# Patient Record
Sex: MALE | Race: WHITE | NOT HISPANIC OR LATINO | Employment: OTHER | RURAL
[De-identification: names, ages, dates, MRNs, and addresses within clinical notes are randomized per-mention and may not be internally consistent; named-entity substitution may affect disease eponyms.]

---

## 2020-02-07 DIAGNOSIS — F41.9 ANXIETY: Primary | ICD-10-CM

## 2020-02-07 RX ORDER — PAROXETINE HYDROCHLORIDE 20 MG/1
1 TABLET, FILM COATED ORAL DAILY
COMMUNITY
Start: 2019-02-19 | End: 2020-02-07 | Stop reason: SDUPTHER

## 2020-02-07 RX ORDER — ASPIRIN 81 MG/1
TABLET ORAL DAILY
COMMUNITY
Start: 2019-02-19

## 2020-02-07 RX ORDER — PAROXETINE HYDROCHLORIDE 20 MG/1
20 TABLET, FILM COATED ORAL DAILY
Qty: 30 TABLET | Refills: 2 | Status: SHIPPED | OUTPATIENT
Start: 2020-02-07 | End: 2020-02-26

## 2020-02-26 DIAGNOSIS — F41.9 ANXIETY: ICD-10-CM

## 2020-02-26 RX ORDER — PAROXETINE HYDROCHLORIDE 20 MG/1
TABLET, FILM COATED ORAL
Qty: 90 TABLET | Refills: 0 | Status: SHIPPED | OUTPATIENT
Start: 2020-02-26 | End: 2020-05-04 | Stop reason: SDUPTHER

## 2020-02-28 ENCOUNTER — OFFICE VISIT (OUTPATIENT)
Dept: FAMILY MEDICINE CLINIC | Facility: CLINIC | Age: 45
End: 2020-02-28

## 2020-02-28 DIAGNOSIS — I10 HYPERTENSION, BENIGN: ICD-10-CM

## 2020-02-28 DIAGNOSIS — S46.911A STRAIN OF RIGHT SHOULDER, INITIAL ENCOUNTER: ICD-10-CM

## 2020-02-28 DIAGNOSIS — M25.511 ACUTE PAIN OF RIGHT SHOULDER: Primary | ICD-10-CM

## 2020-02-28 PROCEDURE — 99214 OFFICE O/P EST MOD 30 MIN: CPT | Performed by: FAMILY MEDICINE

## 2020-02-28 RX ORDER — PREDNISONE 10 MG/1
10 TABLET ORAL DAILY
Qty: 30 TABLET | Refills: 0 | Status: SHIPPED | OUTPATIENT
Start: 2020-02-28 | End: 2020-04-11

## 2020-02-28 RX ORDER — MELOXICAM 15 MG/1
15 TABLET ORAL DAILY
Qty: 30 TABLET | Refills: 12 | Status: SHIPPED | OUTPATIENT
Start: 2020-02-28 | End: 2020-04-11

## 2020-02-28 RX ORDER — LISINOPRIL 20 MG/1
20 TABLET ORAL DAILY
Qty: 30 TABLET | Refills: 12 | Status: SHIPPED | OUTPATIENT
Start: 2020-02-28

## 2020-03-08 VITALS
HEART RATE: 89 BPM | HEIGHT: 73 IN | SYSTOLIC BLOOD PRESSURE: 140 MMHG | TEMPERATURE: 98.3 F | WEIGHT: 243 LBS | DIASTOLIC BLOOD PRESSURE: 90 MMHG | OXYGEN SATURATION: 98 % | RESPIRATION RATE: 18 BRPM | BODY MASS INDEX: 32.2 KG/M2

## 2020-04-07 ENCOUNTER — OFFICE VISIT (OUTPATIENT)
Dept: FAMILY MEDICINE CLINIC | Facility: CLINIC | Age: 45
End: 2020-04-07

## 2020-04-07 VITALS
HEIGHT: 73 IN | SYSTOLIC BLOOD PRESSURE: 110 MMHG | OXYGEN SATURATION: 96 % | BODY MASS INDEX: 31.81 KG/M2 | DIASTOLIC BLOOD PRESSURE: 70 MMHG | TEMPERATURE: 97.7 F | WEIGHT: 240 LBS | RESPIRATION RATE: 18 BRPM | HEART RATE: 101 BPM

## 2020-04-07 DIAGNOSIS — E66.3 OVERWEIGHT (BMI 25.0-29.9): Primary | ICD-10-CM

## 2020-04-07 DIAGNOSIS — M25.511 ACUTE PAIN OF RIGHT SHOULDER: ICD-10-CM

## 2020-04-07 PROCEDURE — 99213 OFFICE O/P EST LOW 20 MIN: CPT | Performed by: FAMILY MEDICINE

## 2020-04-07 PROCEDURE — 20610 DRAIN/INJ JOINT/BURSA W/O US: CPT | Performed by: FAMILY MEDICINE

## 2020-04-07 RX ORDER — CYCLOBENZAPRINE HCL 10 MG
TABLET ORAL
Qty: 30 TABLET | Refills: 5 | Status: SHIPPED | OUTPATIENT
Start: 2020-04-07 | End: 2022-03-23 | Stop reason: SDUPTHER

## 2020-04-07 RX ORDER — CYCLOBENZAPRINE HCL 10 MG
TABLET ORAL
Qty: 30 TABLET | Refills: 5 | Status: SHIPPED | OUTPATIENT
Start: 2020-04-07 | End: 2020-04-07

## 2020-04-07 RX ADMIN — LIDOCAINE HYDROCHLORIDE 0.5 ML: 10 INJECTION, SOLUTION INFILTRATION; PERINEURAL at 17:42

## 2020-04-07 RX ADMIN — BUPIVACAINE HYDROCHLORIDE 0.5 ML: 5 INJECTION, SOLUTION EPIDURAL; INTRACAUDAL at 17:42

## 2020-04-07 RX ADMIN — METHYLPREDNISOLONE ACETATE 40 MG: 40 INJECTION, SUSPENSION INTRA-ARTICULAR; INTRALESIONAL; INTRAMUSCULAR; SOFT TISSUE at 17:42

## 2020-04-07 NOTE — PROGRESS NOTES
Subjective   Ramon Trent is a 44 y.o. male.     Chief Complaint   Patient presents with   • Shoulder Injury       Shoulder Injury    The incident occurred at school. The right shoulder is affected. The incident occurred more than 1 week ago (in February 2020). The injury mechanism was a fall. The quality of the pain is described as aching, shooting and stabbing. The pain radiates to the right arm and right hand (right wrist). The pain is at a severity of 7/10. The pain is severe. Pertinent negatives include no chest pain, numbness or tingling. The symptoms are aggravated by movement. He has tried rest (prednisone, mobic) for the symptoms. The treatment provided mild relief.            I personally reviewed and updated the patient's allergies, medications, problem list, and past medical, surgical, social, and family history.     Family History   Problem Relation Age of Onset   • Gallbladder disease Father    • Heart disease Father         Cardiovascular       Social History     Tobacco Use   • Smoking status: Never Smoker   • Smokeless tobacco: Never Used   • Tobacco comment: No smoke expsosure   Substance Use Topics   • Alcohol use: Yes     Frequency: Monthly or less     Drinks per session: 5 or 6     Binge frequency: Monthly     Comment: Occasional alcohol use   • Drug use: Never       History reviewed. No pertinent surgical history.    Patient Active Problem List   Diagnosis   • Allergic rhinitis   • Anxiety   • GERD (gastroesophageal reflux disease)   • Hyperlipidemia   • Hypertension, benign   • Obsessive compulsive disorder   • Overweight (BMI 25.0-29.9)   • Thyrotoxicosis   • Acute pain of right shoulder         Current Outpatient Medications:   •  aspirin (ECOTRIN LOW STRENGTH) 81 MG EC tablet, Take  by mouth Daily., Disp: , Rfl:   •  lisinopril (PRINIVIL,ZESTRIL) 20 MG tablet, Take 1 tablet by mouth Daily., Disp: 30 tablet, Rfl: 12  •  PARoxetine (PAXIL) 20 MG tablet, TAKE 1 TABLET BY MOUTH ONCE  "DAILY, Disp: 90 tablet, Rfl: 0  •  cyclobenzaprine (FLEXERIL) 10 MG tablet, Take 1/2 to 1 tablet nightly as needed, Disp: 30 tablet, Rfl: 5  •  meloxicam (MOBIC) 15 MG tablet, Take 1 tablet by mouth Daily., Disp: 30 tablet, Rfl: 12  •  predniSONE (DELTASONE) 10 MG tablet, Take 1 tablet by mouth Daily., Disp: 30 tablet, Rfl: 0         Review of Systems   Constitutional: Negative for chills and diaphoresis.   Eyes: Negative for visual disturbance.   Respiratory: Negative for shortness of breath.    Cardiovascular: Negative for chest pain and palpitations.   Gastrointestinal: Negative for abdominal pain and nausea.   Endocrine: Negative for polydipsia and polyphagia.   Musculoskeletal: Negative for neck stiffness.   Skin: Negative for color change and pallor.   Neurological: Negative for tingling, seizures, syncope and numbness.   Hematological: Negative for adenopathy.       I have reviewed and confirmed the accuracy of the ROS as documented by the MA/LPN/RN Fransisco Bell MD      Objective   /70 (BP Location: Right arm, Patient Position: Sitting, Cuff Size: Adult)   Pulse 101   Temp 97.7 °F (36.5 °C)   Resp 18   Ht 185.4 cm (73\")   Wt 109 kg (240 lb)   SpO2 96%   BMI 31.66 kg/m²   Wt Readings from Last 3 Encounters:   04/07/20 109 kg (240 lb)   02/28/20 110 kg (243 lb)   02/19/19 110 kg (241 lb 9.6 oz)     Physical Exam   Constitutional: He is oriented to person, place, and time. He appears well-developed and well-nourished.   Cardiovascular: Normal rate, regular rhythm, S1 normal, S2 normal, normal heart sounds, intact distal pulses and normal pulses. Exam reveals no gallop and no friction rub.   No murmur heard.  Pulmonary/Chest: Effort normal and breath sounds normal. No accessory muscle usage or stridor. He has no decreased breath sounds. He has no wheezes. He has no rhonchi. He has no rales.   Abdominal: Soft. Normal appearance, normal aorta and bowel sounds are normal. He exhibits no distension, no " pulsatile midline mass and no mass. There is no hepatosplenomegaly. There is no tenderness. There is no rigidity, no rebound, no guarding, no CVA tenderness and negative Johnson's sign. No hernia.   Musculoskeletal:        Right shoulder: Normal. He exhibits normal range of motion, no swelling, no effusion, no crepitus, no deformity, no spasm and normal strength.        Left shoulder: Normal. He exhibits normal range of motion, no tenderness, no swelling, no effusion, no crepitus, no deformity, no spasm and normal strength.        Cervical back: Normal. He exhibits normal range of motion, no tenderness, no swelling, no edema, no deformity and no spasm.   Neurological: He is alert and oriented to person, place, and time. Coordination and gait normal.   Skin: Skin is warm and dry. Turgor is normal. He is not diaphoretic. No pallor.         Assessment/Plan   Arthrocentesis  Date/Time: 4/7/2020 5:42 PM  Performed by: Fransisco Bell MD  Authorized by: Fransisco Bell MD   Indications: pain   Body area: shoulder  Joint: right shoulder    Sedation:  Patient sedated: no    Ultrasound guidance: no  Patient tolerance: Patient tolerated the procedure well with no immediate complications          Hypertension.  Stable on lisinopril.  Continue baby aspirin daily.  Discussed low-sodium diet.  Follow-up recheck renal function.  Hyperlipidemia.  History of mild elevation.  Discussed diet, signs of lifestyle modification.  Follow-up recheck fasting labs.  Shoulder pain.  Likely rotator cuff tendinitis.  Injected today.  Rehabilitation exercises discussed.  Continue NSAIDs.  Consider imaging, orthopedics referral if persistent symptoms.    Problem List Items Addressed This Visit        Unprioritized    Overweight (BMI 25.0-29.9) - Primary    Acute pain of right shoulder    Relevant Orders    Arthrocentesis              Expected course, medications, and adverse effects discussed.  Call or return if worsening or persistent  symptoms.

## 2020-04-11 RX ORDER — BUPIVACAINE HYDROCHLORIDE 5 MG/ML
0.5 INJECTION, SOLUTION EPIDURAL; INTRACAUDAL
Status: COMPLETED | OUTPATIENT
Start: 2020-04-07 | End: 2020-04-07

## 2020-04-11 RX ORDER — LIDOCAINE HYDROCHLORIDE 10 MG/ML
0.5 INJECTION, SOLUTION INFILTRATION; PERINEURAL
Status: COMPLETED | OUTPATIENT
Start: 2020-04-07 | End: 2020-04-07

## 2020-04-11 RX ORDER — METHYLPREDNISOLONE ACETATE 40 MG/ML
40 INJECTION, SUSPENSION INTRA-ARTICULAR; INTRALESIONAL; INTRAMUSCULAR; SOFT TISSUE
Status: COMPLETED | OUTPATIENT
Start: 2020-04-07 | End: 2020-04-07

## 2020-05-04 DIAGNOSIS — F41.9 ANXIETY: ICD-10-CM

## 2020-05-04 RX ORDER — PAROXETINE HYDROCHLORIDE 20 MG/1
20 TABLET, FILM COATED ORAL DAILY
Qty: 90 TABLET | Refills: 3 | Status: SHIPPED | OUTPATIENT
Start: 2020-05-04 | End: 2021-03-15

## 2020-05-04 NOTE — TELEPHONE ENCOUNTER
Patient called and needs a refill on his Paroxentine, please send to [facility] in [place] and contact him at .  Thanks Senia   no

## 2020-07-01 ENCOUNTER — TELEPHONE (OUTPATIENT)
Dept: FAMILY MEDICINE CLINIC | Facility: CLINIC | Age: 45
End: 2020-07-01

## 2020-07-07 ENCOUNTER — OFFICE VISIT (OUTPATIENT)
Dept: FAMILY MEDICINE CLINIC | Facility: CLINIC | Age: 45
End: 2020-07-07

## 2020-07-07 VITALS
HEART RATE: 102 BPM | OXYGEN SATURATION: 96 % | DIASTOLIC BLOOD PRESSURE: 64 MMHG | SYSTOLIC BLOOD PRESSURE: 122 MMHG | WEIGHT: 232.8 LBS | HEIGHT: 73 IN | BODY MASS INDEX: 30.85 KG/M2 | RESPIRATION RATE: 18 BRPM

## 2020-07-07 DIAGNOSIS — E66.09 CLASS 1 OBESITY DUE TO EXCESS CALORIES WITHOUT SERIOUS COMORBIDITY WITH BODY MASS INDEX (BMI) OF 31.0 TO 31.9 IN ADULT: ICD-10-CM

## 2020-07-07 DIAGNOSIS — M25.511 ACUTE PAIN OF RIGHT SHOULDER: Primary | ICD-10-CM

## 2020-07-07 PROCEDURE — 20610 DRAIN/INJ JOINT/BURSA W/O US: CPT | Performed by: FAMILY MEDICINE

## 2020-07-07 PROCEDURE — 99213 OFFICE O/P EST LOW 20 MIN: CPT | Performed by: FAMILY MEDICINE

## 2020-07-07 RX ORDER — MELOXICAM 15 MG/1
15 TABLET ORAL DAILY
COMMUNITY
Start: 2020-05-04 | End: 2021-04-05

## 2020-07-07 NOTE — PROGRESS NOTES
Subjective   Ramon Trent is a 44 y.o. male.     Chief Complaint   Patient presents with   • Shoulder Pain       Shoulder Injury    The incident occurred at school. The right shoulder is affected. The incident occurred more than 1 week ago (in February 2020). The injury mechanism was a fall. The quality of the pain is described as aching, shooting and stabbing. The pain radiates to the right arm and right hand (right wrist). The pain is at a severity of 7/10. The pain is severe. Pertinent negatives include no chest pain, numbness or tingling. The symptoms are aggravated by movement. He has tried rest (prednisone, mobic) for the symptoms. The treatment provided mild relief.            I personally reviewed and updated the patient's allergies, medications, problem list, and past medical, surgical, social, and family history.     Family History   Problem Relation Age of Onset   • Gallbladder disease Father    • Heart disease Father         Cardiovascular       Social History     Tobacco Use   • Smoking status: Never Smoker   • Smokeless tobacco: Never Used   • Tobacco comment: No smoke expsosure   Substance Use Topics   • Alcohol use: Yes     Frequency: Monthly or less     Drinks per session: 5 or 6     Binge frequency: Monthly     Comment: Occasional alcohol use   • Drug use: Never       History reviewed. No pertinent surgical history.    Patient Active Problem List   Diagnosis   • Allergic rhinitis   • Anxiety   • GERD (gastroesophageal reflux disease)   • Hyperlipidemia   • Hypertension, benign   • Obsessive compulsive disorder   • Class 1 obesity due to excess calories without serious comorbidity with body mass index (BMI) of 31.0 to 31.9 in adult   • Thyrotoxicosis   • Acute pain of right shoulder         Current Outpatient Medications:   •  aspirin (ECOTRIN LOW STRENGTH) 81 MG EC tablet, Take  by mouth Daily., Disp: , Rfl:   •  cyclobenzaprine (FLEXERIL) 10 MG tablet, Take 1/2 to 1 tablet nightly as  "needed, Disp: 30 tablet, Rfl: 5  •  lisinopril (PRINIVIL,ZESTRIL) 20 MG tablet, Take 1 tablet by mouth Daily., Disp: 30 tablet, Rfl: 12  •  meloxicam (MOBIC) 15 MG tablet, Take 15 mg by mouth Daily., Disp: , Rfl:   •  PARoxetine (PAXIL) 20 MG tablet, Take 1 tablet by mouth Daily., Disp: 90 tablet, Rfl: 3         Review of Systems   Constitutional: Negative for chills and diaphoresis.   Eyes: Negative for visual disturbance.   Respiratory: Negative for shortness of breath.    Cardiovascular: Negative for chest pain and palpitations.   Gastrointestinal: Negative for abdominal pain and nausea.   Endocrine: Negative for polydipsia and polyphagia.   Musculoskeletal: Negative for neck stiffness.   Skin: Negative for color change and pallor.   Neurological: Negative for tingling, seizures, syncope and numbness.   Hematological: Negative for adenopathy.       I have reviewed and confirmed the accuracy of the ROS as documented by the MA/LPN/RN Fransisco Bell MD      Objective   /64 (BP Location: Left arm, Patient Position: Sitting, Cuff Size: Adult)   Pulse 102   Resp 18   Ht 185.4 cm (73\")   Wt 106 kg (232 lb 12.8 oz)   SpO2 96%   BMI 30.71 kg/m²   BP Readings from Last 3 Encounters:   07/07/20 122/64   04/07/20 110/70   02/28/20 140/90     Wt Readings from Last 3 Encounters:   07/07/20 106 kg (232 lb 12.8 oz)   04/07/20 109 kg (240 lb)   02/28/20 110 kg (243 lb)     Physical Exam   Constitutional: He is oriented to person, place, and time. He appears well-developed and well-nourished.   Cardiovascular: Normal rate, regular rhythm, S1 normal, S2 normal, normal heart sounds, intact distal pulses and normal pulses. Exam reveals no gallop and no friction rub.   No murmur heard.  Pulmonary/Chest: Effort normal and breath sounds normal. No accessory muscle usage or stridor. He has no decreased breath sounds. He has no wheezes. He has no rhonchi. He has no rales.   Abdominal: Soft. Normal appearance, normal aorta and " bowel sounds are normal. He exhibits no distension, no pulsatile midline mass and no mass. There is no hepatosplenomegaly. There is no tenderness. There is no rigidity, no rebound, no guarding, no CVA tenderness and negative Johnson's sign. No hernia.   Musculoskeletal:        Right shoulder: Normal. He exhibits normal range of motion, no swelling, no effusion, no crepitus, no deformity, no spasm and normal strength.        Left shoulder: Normal. He exhibits normal range of motion, no tenderness, no swelling, no effusion, no crepitus, no deformity, no spasm and normal strength.        Cervical back: Normal. He exhibits normal range of motion, no tenderness, no swelling, no edema, no deformity and no spasm.   Neurological: He is alert and oriented to person, place, and time. Coordination and gait normal.   Skin: Skin is warm and dry. Turgor is normal. He is not diaphoretic. No pallor.         Assessment/Plan      Medications        Problem List         LOS       Hypertension.  Stable on lisinopril.  Continue baby aspirin daily.  Discussed low-sodium diet.  Follow-up recheck renal function.  Hyperlipidemia.  History of mild elevation.  Discussed diet, signs of lifestyle modification.  Follow-up recheck fasting labs.  Shoulder pain.  Likely rotator cuff tendinitis.    Improved with injection, repeat injection today.  Rehabilitation exercises discussed.  Continue NSAIDs.  Consider imaging, orthopedics referral if persistent symptoms.  He will call back if he decides to proceed with an MRI.  Recommend follow-up visit for comprehensive physical exam screening test.     Joint injection  Injection site: right shoulder  Date/Time: 07/07/2020 5:31 PM  Performed by: Fransisco Bell MD  Authorized by: Fransisco Bell MD  Preparation: Patient was prepped and draped in the usual sterile fashion.  Local anesthesia used: no   Anesthesia:  Local anesthesia used: no   Sedation:  Patient sedated: no    Medications Used:  0.5cc   Thom: [id number] Lot- [id number] EXP-8/2022  0.5cc Lidocaine 10mg/mL: [id number] Lot- [id number] EXP-7/1/2021  1cc DepoMedrol 40mg: [id number] Lot- [id number] EXP-01/2021    Ramon Trent tolerated procedure well.          Problem List Items Addressed This Visit        Unprioritized    Class 1 obesity due to excess calories without serious comorbidity with body mass index (BMI) of 31.0 to 31.9 in adult    Acute pain of right shoulder - Primary              Expected course, medications, and adverse effects discussed.  Call or return if worsening or persistent symptoms.

## 2020-11-17 NOTE — PROGRESS NOTES
Subjective   Ramon Trent is a 45 y.o. male.     Chief Complaint   Patient presents with   • Hypertension   • Anxiety   • Heartburn       Hypertension  This is a chronic problem. The current episode started more than 1 year ago. The problem is uncontrolled. Associated symptoms include anxiety and headaches. Pertinent negatives include no chest pain, orthopnea, palpitations, peripheral edema, PND, shortness of breath or sweats. Risk factors for coronary artery disease include dyslipidemia, male gender, obesity and family history. Current antihypertension treatment includes nothing. The current treatment provides no improvement.   Anxiety  Presents for follow-up visit. Symptoms include decreased concentration, depressed mood, excessive worry, insomnia, irritability and nervous/anxious behavior. Patient reports no chest pain, nausea, palpitations, shortness of breath or suicidal ideas.       Heartburn  He complains of abdominal pain, belching and heartburn. He reports no chest pain, no nausea, no sore throat or no tooth decay. This is a recurrent problem. The current episode started more than 1 year ago. The problem occurs occasionally. The problem has been gradually worsening. The heartburn wakes him from sleep. The symptoms are aggravated by certain foods and caffeine. There are no known risk factors. He has tried an antacid for the symptoms.            I personally reviewed and updated the patient's allergies, medications, problem list, and past medical, surgical, social, and family history. I have reviewed and confirmed the accuracy of the History of Present Illness and Review of Symptoms as documented by the MA/AQUILINO/RN. Fransisco Bell MD    Family History   Problem Relation Age of Onset   • Gallbladder disease Father    • Heart disease Father         Cardiovascular       Social History     Tobacco Use   • Smoking status: Never Smoker   • Smokeless tobacco: Never Used   • Tobacco comment: No smoke expsosure    Substance Use Topics   • Alcohol use: Yes     Frequency: Monthly or less     Drinks per session: 5 or 6     Binge frequency: Monthly     Comment: Occasional alcohol use   • Drug use: Never       History reviewed. No pertinent surgical history.    Patient Active Problem List   Diagnosis   • Allergic rhinitis   • Anxiety   • GERD (gastroesophageal reflux disease)   • Hyperlipidemia   • Hypertension, benign   • Obsessive compulsive disorder   • Class 1 obesity due to excess calories without serious comorbidity with body mass index (BMI) of 31.0 to 31.9 in adult   • Thyrotoxicosis   • Acute pain of right shoulder   • Heartburn         Current Outpatient Medications:   •  aspirin (ECOTRIN LOW STRENGTH) 81 MG EC tablet, Take  by mouth Daily., Disp: , Rfl:   •  cyclobenzaprine (FLEXERIL) 10 MG tablet, Take 1/2 to 1 tablet nightly as needed, Disp: 30 tablet, Rfl: 5  •  lisinopril (PRINIVIL,ZESTRIL) 20 MG tablet, Take 1 tablet by mouth Daily., Disp: 30 tablet, Rfl: 12  •  PARoxetine (PAXIL) 20 MG tablet, Take 1 tablet by mouth Daily., Disp: 90 tablet, Rfl: 3  •  meloxicam (MOBIC) 15 MG tablet, Take 15 mg by mouth Daily., Disp: , Rfl:   •  omeprazole (priLOSEC) 40 MG capsule, Take 1 capsule by mouth Daily., Disp: 30 capsule, Rfl: 12         Review of Systems   Constitutional: Positive for irritability. Negative for chills and diaphoresis.   HENT: Negative for sore throat, trouble swallowing and voice change.    Eyes: Negative for visual disturbance.   Respiratory: Negative for shortness of breath.    Cardiovascular: Negative for chest pain, palpitations, orthopnea and PND.   Gastrointestinal: Positive for abdominal pain. Negative for nausea.   Endocrine: Negative for polydipsia and polyphagia.   Genitourinary: Negative for hematuria.   Musculoskeletal: Negative for neck stiffness.   Skin: Negative for color change and pallor.   Allergic/Immunologic: Negative for immunocompromised state.   Neurological: Negative for seizures  "and syncope.   Hematological: Negative for adenopathy.   Psychiatric/Behavioral: Positive for decreased concentration and depressed mood. Negative for hallucinations, sleep disturbance and suicidal ideas. The patient is nervous/anxious and has insomnia.        I have reviewed and confirmed the accuracy of the ROS as documented by the MA/LPN/RN Fransisco Bell MD      Objective   /98 (BP Location: Right arm, Patient Position: Sitting, Cuff Size: Adult)   Pulse 108   Temp 97.9 °F (36.6 °C)   Resp 18   Ht 185.4 cm (73\")   Wt 110 kg (242 lb 9.6 oz)   SpO2 98%   BMI 32.01 kg/m²   BP Readings from Last 3 Encounters:   11/18/20 143/98   07/07/20 122/64   04/07/20 110/70     Wt Readings from Last 3 Encounters:   11/18/20 110 kg (242 lb 9.6 oz)   07/07/20 106 kg (232 lb 12.8 oz)   04/07/20 109 kg (240 lb)     Physical Exam  Constitutional:       Appearance: Normal appearance. He is well-developed. He is not diaphoretic.   Cardiovascular:      Rate and Rhythm: Normal rate and regular rhythm.      Pulses: Normal pulses.      Heart sounds: Normal heart sounds, S1 normal and S2 normal. No murmur. No friction rub. No gallop.    Pulmonary:      Effort: Pulmonary effort is normal. No accessory muscle usage.      Breath sounds: Normal breath sounds. No stridor. No decreased breath sounds, wheezing, rhonchi or rales.   Abdominal:      General: Bowel sounds are normal. There is no distension.      Palpations: Abdomen is soft. Abdomen is not rigid. There is no mass or pulsatile mass.      Tenderness: There is no abdominal tenderness. There is no guarding or rebound. Negative signs include Johnson's sign.      Hernia: No hernia is present.   Musculoskeletal:      Right shoulder: Normal. He exhibits normal range of motion, no swelling, no effusion, no crepitus, no deformity, no spasm and normal strength.      Left shoulder: Normal. He exhibits normal range of motion, no tenderness, no swelling, no effusion, no crepitus, no " deformity, no spasm and normal strength.      Cervical back: Normal. He exhibits normal range of motion, no tenderness, no swelling, no edema, no deformity and no spasm.   Skin:     General: Skin is warm and dry.      Coloration: Skin is not pale.   Neurological:      Mental Status: He is alert and oriented to person, place, and time.      Coordination: Coordination normal.      Gait: Gait normal.         Recent Lab Results:    No results found for: HGBA1C     No results found for: LDL, LDLDIRECT  No results found for: CHOL  No results found for: TRIG  No results found for: HDL  No results found for: PSA  No results found for: WBC, HGB, HCT, MCV, PLT  No results found for: TSH, A7GHVLY, N5VENNN   No results found for: GLUCOSE, BUN, CREATININE, EGFRIFNONA, EGFRIFAFRI, BCR, K, CO2, CALCIUM, PROTENTOTREF, ALBUMIN, LABIL2, BILIRUBIN, AST, ALT  No results found for: ROLF, RF, SEDRATE   No results found for: CRP   No results found for: IRON, TIBC, FERRITIN   No results found for: EPORFVHP87       Assessment/Plan      Medications        Problem List         LOS    GERD.  Worse today.  Stop famotidine, start omeprazole.  H. pylori.  Discussed diet, lifestyle modification.  Follow-up 3 months.  Consider right upper quadrant ultrasound if persistent symptoms.  Hypertension.  Has done well on on lisinopril, he stopped Rx, restart.  Continue baby aspirin daily.  Discussed low-sodium diet.  Follow-up recheck renal function.  Hyperlipidemia.  History of mild elevation.  Discussed diet, signs of lifestyle modification.  Follow-up recheck fasting labs.  Shoulder pain.  Much improved/resolving today.  Has seen orthopedics, considering MRI.  For likely rotator cuff tendinitis.    Improved with injection, repeat injection today.  Rehabilitation exercises discussed.  Continue NSAIDs.  Consider imaging, orthopedics referral if persistent symptoms.    follow-up visit for comprehensive physical exam and screening tests  scheduled.      Problem List Items Addressed This Visit        Unprioritized    Anxiety    Overview     overall doing well on Paxil at 20 mg,   ok to attempt gradual wean this summer         Hypertension, benign - Primary    Overview     Poor control with pain and he has been w/o meds. Resume and f/U in 2 weeks.   Low salt diet and regular exercise and follow.          Class 1 obesity due to excess calories without serious comorbidity with body mass index (BMI) of 31.0 to 31.9 in adult    Acute pain of right shoulder    Heartburn    Relevant Medications    omeprazole (priLOSEC) 40 MG capsule    Other Relevant Orders    POCT H. pylori antibodies (Completed)              Expected course, medications, and adverse effects discussed.  Call or return if worsening or persistent symptoms.  I wore protective equipment throughout this patient encounter including a mask, gloves, and eye protection.  Hand hygiene was performed before donning protective equipment and after removal when leaving the room.

## 2020-11-18 ENCOUNTER — OFFICE VISIT (OUTPATIENT)
Dept: FAMILY MEDICINE CLINIC | Facility: CLINIC | Age: 45
End: 2020-11-18

## 2020-11-18 VITALS
WEIGHT: 242.6 LBS | DIASTOLIC BLOOD PRESSURE: 98 MMHG | RESPIRATION RATE: 18 BRPM | HEART RATE: 108 BPM | HEIGHT: 73 IN | SYSTOLIC BLOOD PRESSURE: 143 MMHG | TEMPERATURE: 97.9 F | OXYGEN SATURATION: 98 % | BODY MASS INDEX: 32.15 KG/M2

## 2020-11-18 DIAGNOSIS — I10 HYPERTENSION, BENIGN: Primary | ICD-10-CM

## 2020-11-18 DIAGNOSIS — R12 HEARTBURN: ICD-10-CM

## 2020-11-18 DIAGNOSIS — F41.9 ANXIETY: ICD-10-CM

## 2020-11-18 DIAGNOSIS — M25.511 ACUTE PAIN OF RIGHT SHOULDER: ICD-10-CM

## 2020-11-18 DIAGNOSIS — E66.09 CLASS 1 OBESITY DUE TO EXCESS CALORIES WITHOUT SERIOUS COMORBIDITY WITH BODY MASS INDEX (BMI) OF 31.0 TO 31.9 IN ADULT: ICD-10-CM

## 2020-11-18 LAB — H PYLORI IGG SER IA-ACNC: NEGATIVE

## 2020-11-18 PROCEDURE — 86318 IA INFECTIOUS AGENT ANTIBODY: CPT | Performed by: FAMILY MEDICINE

## 2020-11-18 PROCEDURE — 99213 OFFICE O/P EST LOW 20 MIN: CPT | Performed by: FAMILY MEDICINE

## 2020-11-18 RX ORDER — OMEPRAZOLE 40 MG/1
40 CAPSULE, DELAYED RELEASE ORAL DAILY
Qty: 30 CAPSULE | Refills: 12 | Status: SHIPPED | OUTPATIENT
Start: 2020-11-18 | End: 2021-12-08

## 2021-01-11 NOTE — PROGRESS NOTES
Subjective   Ramon Trent is a 45 y.o. male.     No chief complaint on file.      Patient states he saw ortho for R shoulder pain and was told his rotator cuff was torn but couldn't guarantee that surgery would fix the pain, so patient still deciding on what he'd like to do.  He states the pain is somewhat better, but was hoping for a shoulder injection today, but does state that we did inject his shoulder in November when he was last seen.    Shoulder Injury   The incident occurred at school. The right shoulder is affected. The incident occurred more than 1 week ago (in February 2020). The injury mechanism was a fall. The quality of the pain is described as aching, shooting and stabbing. The pain radiates to the right arm and right hand (right wrist). The pain is at a severity of 7/10. The pain is severe. Pertinent negatives include no chest pain, numbness or tingling. The symptoms are aggravated by movement. He has tried rest (prednisone, mobic) for the symptoms. The treatment provided mild relief.            I personally reviewed and updated the patient's allergies, medications, problem list, and past medical, surgical, social, and family history. I have reviewed and confirmed the accuracy of the History of Present Illness and Review of Symptoms as documented by the MA/LPN/RN. Fransisco Bell MD    Family History   Problem Relation Age of Onset   • Gallbladder disease Father    • Heart disease Father         Cardiovascular       Social History     Tobacco Use   • Smoking status: Never Smoker   • Smokeless tobacco: Never Used   • Tobacco comment: No smoke expsosure   Substance Use Topics   • Alcohol use: Yes     Frequency: Monthly or less     Drinks per session: 5 or 6     Binge frequency: Monthly     Comment: Occasional alcohol use   • Drug use: Never       History reviewed. No pertinent surgical history.    Patient Active Problem List   Diagnosis   • Allergic rhinitis   • Anxiety   • GERD  "(gastroesophageal reflux disease)   • Hyperlipidemia   • Hypertension, benign   • Obsessive compulsive disorder   • Class 1 obesity due to excess calories without serious comorbidity with body mass index (BMI) of 31.0 to 31.9 in adult   • Thyrotoxicosis   • Acute pain of right shoulder   • Heartburn         Current Outpatient Medications:   •  aspirin (ECOTRIN LOW STRENGTH) 81 MG EC tablet, Take  by mouth Daily., Disp: , Rfl:   •  cyclobenzaprine (FLEXERIL) 10 MG tablet, Take 1/2 to 1 tablet nightly as needed, Disp: 30 tablet, Rfl: 5  •  lisinopril (PRINIVIL,ZESTRIL) 20 MG tablet, Take 1 tablet by mouth Daily., Disp: 30 tablet, Rfl: 12  •  meloxicam (MOBIC) 15 MG tablet, Take 15 mg by mouth Daily., Disp: , Rfl:   •  omeprazole (priLOSEC) 40 MG capsule, Take 1 capsule by mouth Daily., Disp: 30 capsule, Rfl: 12  •  PARoxetine (PAXIL) 20 MG tablet, Take 1 tablet by mouth Daily., Disp: 90 tablet, Rfl: 3         Review of Systems   Constitutional: Negative for chills and diaphoresis.   Eyes: Negative for visual disturbance.   Respiratory: Negative for shortness of breath.    Cardiovascular: Negative.  Negative for chest pain and palpitations.   Gastrointestinal: Negative for abdominal pain and nausea.   Endocrine: Negative for polydipsia and polyphagia.   Musculoskeletal: Negative for neck stiffness.   Skin: Negative for color change and pallor.   Neurological: Negative for tingling, seizures, syncope and numbness.   Hematological: Negative for adenopathy.       I have reviewed and confirmed the accuracy of the ROS as documented by the MA/LPN/RN Fransisco Bell MD      Objective   /84   Pulse 112   Temp 97.5 °F (36.4 °C)   Resp 16   Ht 185.4 cm (73\")   Wt 111 kg (244 lb 12.8 oz)   SpO2 98%   BMI 32.30 kg/m²   BP Readings from Last 3 Encounters:   01/12/21 142/84   11/18/20 143/98   07/07/20 122/64     Wt Readings from Last 3 Encounters:   01/12/21 111 kg (244 lb 12.8 oz)   11/18/20 110 kg (242 lb 9.6 oz) "   07/07/20 106 kg (232 lb 12.8 oz)     Physical Exam  Constitutional:       Appearance: Normal appearance. He is well-developed. He is not diaphoretic.   Cardiovascular:      Rate and Rhythm: Normal rate and regular rhythm.      Pulses: Normal pulses.      Heart sounds: Normal heart sounds, S1 normal and S2 normal. No murmur. No friction rub. No gallop.    Pulmonary:      Effort: Pulmonary effort is normal. No accessory muscle usage.      Breath sounds: Normal breath sounds. No stridor. No decreased breath sounds, wheezing, rhonchi or rales.   Abdominal:      General: Bowel sounds are normal. There is no distension.      Palpations: Abdomen is soft. Abdomen is not rigid. There is no mass or pulsatile mass.      Tenderness: There is no abdominal tenderness. There is no guarding or rebound. Negative signs include Johnson's sign.      Hernia: No hernia is present.   Musculoskeletal:      Right shoulder: Normal. He exhibits normal range of motion, no swelling, no effusion, no crepitus, no deformity, no spasm and normal strength.      Left shoulder: Normal. He exhibits normal range of motion, no tenderness, no swelling, no effusion, no crepitus, no deformity, no spasm and normal strength.      Cervical back: Normal. He exhibits normal range of motion, no tenderness, no swelling, no edema, no deformity and no spasm.   Skin:     General: Skin is warm and dry.      Coloration: Skin is not pale.   Neurological:      Mental Status: He is alert and oriented to person, place, and time.      Coordination: Coordination normal.      Gait: Gait normal.         Data / Lab Results:    No results found for: HGBA1C     No results found for: LDL, LDLDIRECT  No results found for: CHOL  No results found for: TRIG  No results found for: HDL  No results found for: PSA  No results found for: WBC, HGB, HCT, MCV, PLT  No results found for: TSH, Z6MBPON, H9XEEIE   No results found for: GLUCOSE, BUN, CREATININE, EGFRIFNONA, EGFRIFAFRI, BCR, K,  CO2, CALCIUM, PROTENTOTREF, ALBUMIN, LABIL2, BILIRUBIN, AST, ALT  No results found for: ROLF, RF, SEDRATE   No results found for: CRP   No results found for: IRON, TIBC, FERRITIN   No results found for: [id number]       Assessment/Plan      Medications        Problem List         LOS    GERD.    Much improved today on omeprazole, taking intermittently.  H. Pylori negative.  Discussed diet, lifestyle modification.  Follow-up 3 months.  Consider right upper quadrant ultrasound if persistent symptoms.  Hypertension.  Has done well on on lisinopril, he stopped Rx, restart.  Continue baby aspirin daily.  Discussed low-sodium diet.  Follow-up recheck renal function.  Hyperlipidemia.  History of mild elevation.  Discussed diet, signs of lifestyle modification.  Follow-up recheck fasting labs.  Shoulder pain.  Improved today.  Has seen orthopedics, had tear per MRI,   considering surgery.    Will get records, consider second opinion from Dr. Fairchild.  Continue PT/rehabilitation exercise discussed.  Improved with injection, follow up repeat injection.  Continue NSAIDs.    follow-up visit for comprehensive physical exam and screening tests scheduled.      Diagnoses and all orders for this visit:    1. Acute pain of right shoulder (Primary)    2. Class 1 obesity due to excess calories without serious comorbidity with body mass index (BMI) of 31.0 to 31.9 in adult              Expected course, medications, and adverse effects discussed.  Call or return if worsening or persistent symptoms.  I wore protective equipment throughout this patient encounter including a mask, gloves, and eye protection.  Hand hygiene was performed before donning protective equipment and after removal when leaving the room. The complete contents of the Assessment and Plan and Data/Lab Results as documented above have been reviewed and addressed by myself with the patient today as part of an ongoing evaluation / treatment plan.  If some of the documentation  has been copied from a previous note and is unchanged it indicates that this problem / plan has been assessed today but is stable from a previous visit and no changes have been recommended.

## 2021-01-12 ENCOUNTER — OFFICE VISIT (OUTPATIENT)
Dept: FAMILY MEDICINE CLINIC | Facility: CLINIC | Age: 46
End: 2021-01-12

## 2021-01-12 VITALS
SYSTOLIC BLOOD PRESSURE: 142 MMHG | OXYGEN SATURATION: 98 % | DIASTOLIC BLOOD PRESSURE: 84 MMHG | BODY MASS INDEX: 32.44 KG/M2 | HEART RATE: 112 BPM | WEIGHT: 244.8 LBS | TEMPERATURE: 97.5 F | RESPIRATION RATE: 16 BRPM | HEIGHT: 73 IN

## 2021-01-12 DIAGNOSIS — E66.09 CLASS 1 OBESITY DUE TO EXCESS CALORIES WITHOUT SERIOUS COMORBIDITY WITH BODY MASS INDEX (BMI) OF 31.0 TO 31.9 IN ADULT: ICD-10-CM

## 2021-01-12 DIAGNOSIS — M25.511 ACUTE PAIN OF RIGHT SHOULDER: Primary | ICD-10-CM

## 2021-01-12 PROCEDURE — 99213 OFFICE O/P EST LOW 20 MIN: CPT | Performed by: FAMILY MEDICINE

## 2021-01-13 ENCOUNTER — TELEPHONE (OUTPATIENT)
Dept: FAMILY MEDICINE CLINIC | Facility: CLINIC | Age: 46
End: 2021-01-13

## 2021-01-13 NOTE — TELEPHONE ENCOUNTER
I spoke to ana today and he said he absolutely can not move forward with surgery until the end of the year this year. He said he has an outrageous deductible and co pay for every dr visit. He would like to wait and see  in the fall to move forward with any type of surgery in the winter. He feels if he goes now they will still have to see him in the fall to re-address before a surgery can be scheduled. He plans to come get shoulder injection to get through his busy season at work with you and in the fall will have us set this up.

## 2021-01-13 NOTE — TELEPHONE ENCOUNTER
----- Message from Franissco Bell MD sent at 1/12/2021  5:47 PM EST -----  Let him know I was able to review his MRI and he does have a complete tear of one of his rotator cuff ligaments, the supraspinatus.  The ligament has atrophied and retracted which will make surgery challenging.  Would recommend getting a second opinion from Dr. Fairchild at [facility], if he is ready to do this could go ahead and get him scheduled, thanks

## 2021-02-16 NOTE — PROGRESS NOTES
Subjective   Garrison Trent is a 45 y.o. male.     Chief Complaint   Patient presents with   • Shoulder Pain       Shoulder Injury   The incident occurred at school. The right shoulder is affected. The incident occurred more than 1 week ago (in February 2020). The injury mechanism was a fall. The quality of the pain is described as aching, shooting and stabbing. The pain radiates to the right arm and right hand (right wrist). The pain is at a severity of 7/10. The pain is severe. Pertinent negatives include no chest pain, numbness or tingling. The symptoms are aggravated by movement. He has tried rest (prednisone, mobic) for the symptoms. The treatment provided mild relief.            I personally reviewed and updated the patient's allergies, medications, problem list, and past medical, surgical, social, and family history. I have reviewed and confirmed the accuracy of the History of Present Illness and Review of Symptoms as documented by the MA/LPN/RN. Fransisco Bell MD    Family History   Problem Relation Age of Onset   • Gallbladder disease Father    • Heart disease Father         Cardiovascular       Social History     Tobacco Use   • Smoking status: Never Smoker   • Smokeless tobacco: Never Used   • Tobacco comment: No smoke expsosure   Substance Use Topics   • Alcohol use: Yes     Frequency: Monthly or less     Drinks per session: 5 or 6     Binge frequency: Monthly     Comment: Occasional alcohol use   • Drug use: Never       History reviewed. No pertinent surgical history.    Patient Active Problem List   Diagnosis   • Allergic rhinitis   • Anxiety   • GERD (gastroesophageal reflux disease)   • Hyperlipidemia   • Hypertension, benign   • Obsessive compulsive disorder   • Class 1 obesity due to excess calories without serious comorbidity with body mass index (BMI) of 31.0 to 31.9 in adult   • Thyrotoxicosis   • Acute pain of right shoulder   • Heartburn         Current Outpatient Medications:   •  aspirin  "(ECOTRIN LOW STRENGTH) 81 MG EC tablet, Take  by mouth Daily., Disp: , Rfl:   •  cyclobenzaprine (FLEXERIL) 10 MG tablet, Take 1/2 to 1 tablet nightly as needed, Disp: 30 tablet, Rfl: 5  •  lisinopril (PRINIVIL,ZESTRIL) 20 MG tablet, Take 1 tablet by mouth Daily., Disp: 30 tablet, Rfl: 12  •  meloxicam (MOBIC) 15 MG tablet, Take 15 mg by mouth Daily., Disp: , Rfl:   •  omeprazole (priLOSEC) 40 MG capsule, Take 1 capsule by mouth Daily., Disp: 30 capsule, Rfl: 12  •  PARoxetine (PAXIL) 20 MG tablet, Take 1 tablet by mouth Daily., Disp: 90 tablet, Rfl: 3         Review of Systems   Constitutional: Negative for chills and diaphoresis.   HENT: Negative for trouble swallowing and voice change.    Eyes: Negative for visual disturbance.   Respiratory: Negative for shortness of breath.    Cardiovascular: Negative for chest pain and palpitations.   Gastrointestinal: Negative for abdominal pain and nausea.   Endocrine: Negative for polydipsia and polyphagia.   Genitourinary: Negative for hematuria.   Musculoskeletal: Negative for neck stiffness.   Skin: Negative for color change and pallor.   Allergic/Immunologic: Negative for immunocompromised state.   Neurological: Negative for tingling, seizures, syncope and numbness.   Hematological: Negative for adenopathy.   Psychiatric/Behavioral: Negative for hallucinations, sleep disturbance and suicidal ideas.       I have reviewed and confirmed the accuracy of the ROS as documented by the MA/LPN/RN Fransisco Bell MD      Objective   /89 (BP Location: Right arm, Patient Position: Sitting, Cuff Size: Adult)   Pulse 96   Temp 97.3 °F (36.3 °C)   Resp 18   Ht 185.4 cm (73\")   Wt 111 kg (243 lb 12.8 oz)   SpO2 97%   BMI 32.17 kg/m²   BP Readings from Last 3 Encounters:   02/19/21 133/89   01/12/21 142/84   11/18/20 143/98     Wt Readings from Last 3 Encounters:   02/19/21 111 kg (243 lb 12.8 oz)   01/12/21 111 kg (244 lb 12.8 oz)   11/18/20 110 kg (242 lb 9.6 oz) "     Physical Exam  Constitutional:       Appearance: Normal appearance. He is well-developed. He is not diaphoretic.   Cardiovascular:      Rate and Rhythm: Normal rate and regular rhythm.      Pulses: Normal pulses.      Heart sounds: Normal heart sounds, S1 normal and S2 normal. No murmur. No friction rub. No gallop.    Pulmonary:      Effort: Pulmonary effort is normal. No accessory muscle usage.      Breath sounds: Normal breath sounds. No stridor. No decreased breath sounds, wheezing, rhonchi or rales.   Abdominal:      General: Bowel sounds are normal. There is no distension.      Palpations: Abdomen is soft. Abdomen is not rigid. There is no mass or pulsatile mass.      Tenderness: There is no abdominal tenderness. There is no guarding or rebound. Negative signs include Johnson's sign.      Hernia: No hernia is present.   Musculoskeletal:      Right shoulder: Normal. He exhibits normal range of motion, no swelling, no effusion, no crepitus, no deformity, no spasm and normal strength.      Left shoulder: Normal. He exhibits normal range of motion, no tenderness, no swelling, no effusion, no crepitus, no deformity, no spasm and normal strength.      Cervical back: Normal. He exhibits normal range of motion, no tenderness, no swelling, no edema, no deformity and no spasm.   Skin:     General: Skin is warm and dry.      Coloration: Skin is not pale.   Neurological:      Mental Status: He is alert and oriented to person, place, and time.      Coordination: Coordination normal.      Gait: Gait normal.         Data / Lab Results:    No results found for: HGBA1C     No results found for: LDL, LDLDIRECT  No results found for: CHOL  No results found for: TRIG  No results found for: HDL  No results found for: PSA  No results found for: WBC, HGB, HCT, MCV, PLT  No results found for: TSH, A8YXCTR, E7WRRYH   No results found for: GLUCOSE, BUN, CREATININE, EGFRIFNONA, EGFRIFAFRI, BCR, K, CO2, CALCIUM, PROTENTOTREF, ALBUMIN,  LABIL2, BILIRUBIN, AST, ALT  No results found for: ROLF, RF, SEDRATE   No results found for: CRP   No results found for: IRON, TIBC, FERRITIN   No results found for: [id number]       Assessment/Plan   Joint injection  Injection site: right shoulder  Date/Time: 02/19/2021 15:44 EST  Performed by: Fransisco Bell MD  Authorized by: Fransisco Bell MD  Preparation: Patient was prepped and draped in the usual sterile fashion.  Local anesthesia used: no   Anesthesia:  Local anesthesia used: no   Sedation:  Patient sedated: no    Medications Used:  0.5cc  Marcaine: [id number] Lot- [id number] EXP-3/1/2023  0.5cc Lidocaine 10mg/mL: [id number] Lot- [id number] EXP-7/1/2021  1cc DepoMedrol 40mg: [id number] Lot- [id number] EXP-10/1/2021    Garrison Trent tolerated procedure well.         Medications        Problem List         LOS      GERD.    Much improved today on omeprazole, taking intermittently.  H. Pylori negative.  Discussed diet, lifestyle modification.  Follow-up 3 months.  Consider right upper quadrant ultrasound if persistent symptoms.  Hypertension.  Improved today back on lisinopril.  Continue baby aspirin daily.  Discussed low-sodium diet.  Follow-up recheck renal function.  Hyperlipidemia.  History of mild elevation.  Discussed diet, signs of lifestyle modification.  Follow-up recheck fasting labs.  Rotator cuff tear.  Complete tear right supraspinatus.  Recommend second opinion from Dr. Fairchild he states he will consider next fall..  Continue PT/rehabilitation exercise discussed.  Improved with injection, repeated today.  Continue NSAIDs.    follow-up visit for comprehensive physical exam and screening tests scheduled.             Diagnoses and all orders for this visit:    1. Acute pain of right shoulder (Primary)    2. Class 1 obesity due to excess calories without serious comorbidity with body mass index (BMI) of 31.0 to 31.9 in adult    Other orders  -     methylPREDNISolone acetate (DEPO-medrol)  injection 40 mg              Expected course, medications, and adverse effects discussed.  Call or return if worsening or persistent symptoms.  I wore protective equipment throughout this patient encounter including a mask, gloves, and eye protection.  Hand hygiene was performed before donning protective equipment and after removal when leaving the room. The complete contents of the Assessment and Plan and Data/Lab Results as documented above have been reviewed and addressed by myself with the patient today as part of an ongoing evaluation / treatment plan.  If some of the documentation has been copied from a previous note and is unchanged it indicates that this problem / plan has been assessed today but is stable from a previous visit and no changes have been recommended.

## 2021-02-19 ENCOUNTER — OFFICE VISIT (OUTPATIENT)
Dept: FAMILY MEDICINE CLINIC | Facility: CLINIC | Age: 46
End: 2021-02-19

## 2021-02-19 VITALS
SYSTOLIC BLOOD PRESSURE: 133 MMHG | BODY MASS INDEX: 32.31 KG/M2 | RESPIRATION RATE: 18 BRPM | HEIGHT: 73 IN | TEMPERATURE: 97.3 F | HEART RATE: 96 BPM | OXYGEN SATURATION: 97 % | DIASTOLIC BLOOD PRESSURE: 89 MMHG | WEIGHT: 243.8 LBS

## 2021-02-19 DIAGNOSIS — M25.511 ACUTE PAIN OF RIGHT SHOULDER: Primary | ICD-10-CM

## 2021-02-19 DIAGNOSIS — E66.09 CLASS 1 OBESITY DUE TO EXCESS CALORIES WITHOUT SERIOUS COMORBIDITY WITH BODY MASS INDEX (BMI) OF 31.0 TO 31.9 IN ADULT: ICD-10-CM

## 2021-02-19 PROCEDURE — 99213 OFFICE O/P EST LOW 20 MIN: CPT | Performed by: FAMILY MEDICINE

## 2021-02-19 PROCEDURE — 20610 DRAIN/INJ JOINT/BURSA W/O US: CPT | Performed by: FAMILY MEDICINE

## 2021-02-21 RX ORDER — METHYLPREDNISOLONE ACETATE 40 MG/ML
40 INJECTION, SUSPENSION INTRA-ARTICULAR; INTRALESIONAL; INTRAMUSCULAR; SOFT TISSUE ONCE
Status: DISCONTINUED | OUTPATIENT
Start: 2021-02-21 | End: 2022-02-02

## 2021-02-24 ENCOUNTER — OFFICE VISIT (OUTPATIENT)
Dept: FAMILY MEDICINE CLINIC | Facility: CLINIC | Age: 46
End: 2021-02-24

## 2021-02-24 VITALS
WEIGHT: 244.2 LBS | BODY MASS INDEX: 32.37 KG/M2 | SYSTOLIC BLOOD PRESSURE: 132 MMHG | DIASTOLIC BLOOD PRESSURE: 88 MMHG | HEIGHT: 73 IN | OXYGEN SATURATION: 97 % | HEART RATE: 83 BPM | RESPIRATION RATE: 18 BRPM | TEMPERATURE: 98 F

## 2021-02-24 VITALS
RESPIRATION RATE: 18 BRPM | SYSTOLIC BLOOD PRESSURE: 132 MMHG | WEIGHT: 244.2 LBS | BODY MASS INDEX: 32.37 KG/M2 | OXYGEN SATURATION: 97 % | TEMPERATURE: 98 F | DIASTOLIC BLOOD PRESSURE: 88 MMHG | HEIGHT: 73 IN | HEART RATE: 83 BPM

## 2021-02-24 DIAGNOSIS — Z02.4 ENCOUNTER FOR CDL (COMMERCIAL DRIVING LICENSE) EXAM: Primary | ICD-10-CM

## 2021-02-24 DIAGNOSIS — E66.09 CLASS 1 OBESITY DUE TO EXCESS CALORIES WITHOUT SERIOUS COMORBIDITY WITH BODY MASS INDEX (BMI) OF 31.0 TO 31.9 IN ADULT: ICD-10-CM

## 2021-02-24 DIAGNOSIS — I10 HYPERTENSION, BENIGN: ICD-10-CM

## 2021-02-24 DIAGNOSIS — Z00.01 ANNUAL VISIT FOR GENERAL ADULT MEDICAL EXAMINATION WITH ABNORMAL FINDINGS: Primary | ICD-10-CM

## 2021-02-24 LAB
BILIRUB BLD-MCNC: NEGATIVE MG/DL
CLARITY, POC: CLEAR
COLOR UR: YELLOW
GLUCOSE UR STRIP-MCNC: NEGATIVE MG/DL
KETONES UR QL: NEGATIVE
LEUKOCYTE EST, POC: NEGATIVE
NITRITE UR-MCNC: NEGATIVE MG/ML
PH UR: 6 [PH] (ref 5–8)
PROT UR STRIP-MCNC: NEGATIVE MG/DL
RBC # UR STRIP: NEGATIVE /UL
SP GR UR: 1.01 (ref 1–1.03)
UROBILINOGEN UR QL: NORMAL

## 2021-02-24 PROCEDURE — 81002 URINALYSIS NONAUTO W/O SCOPE: CPT | Performed by: FAMILY MEDICINE

## 2021-02-24 PROCEDURE — 99396 PREV VISIT EST AGE 40-64: CPT | Performed by: FAMILY MEDICINE

## 2021-02-24 PROCEDURE — DOTPHY: Performed by: FAMILY MEDICINE

## 2021-02-24 NOTE — PROGRESS NOTES
Subjective   Garrison Trent is a 45 y.o. male.     Chief Complaint   Patient presents with   • Annual Exam   • Hypertension       The patient is here: to discuss health maintenance and disease prevention to follow up on multiple medical problems.  Last comprehensive physical was on 2/19/2019.  Previous physical was performed by  Fransisco Bell MD  Overall has: moderate activity with work/home activities, exercises 2 - 3 times per week, good appetite, feels well with minor complaints, good energy level and is sleeping well. Nutrition: balanced diet. Last tetanus shot was unknown. Patients last stress test 7/20/2007.    Hypertension  This is a chronic problem. The current episode started more than 1 year ago. The problem is controlled. Pertinent negatives include no blurred vision, chest pain, headaches, neck pain, palpitations, shortness of breath or sweats. Risk factors for coronary artery disease include male gender and obesity. Current antihypertension treatment includes ACE inhibitors.        Recent Hospitalizations:  No hospitalization(s) within the last year..  ccc      I personally reviewed and updated the patient's allergies, medications, problem list, and past medical, surgical, social, and family history. I have reviewed and confirmed the accuracy of the HPI and ROS as documented by the MA/LPN/RN Fransisco Bell MD    Family History   Problem Relation Age of Onset   • Gallbladder disease Father    • Heart disease Father         Cardiovascular       Social History     Tobacco Use   • Smoking status: Never Smoker   • Smokeless tobacco: Never Used   • Tobacco comment: No smoke expsosure   Substance Use Topics   • Alcohol use: Yes     Frequency: Monthly or less     Drinks per session: 5 or 6     Binge frequency: Monthly     Comment: Occasional alcohol use   • Drug use: Never       No past surgical history on file.    Patient Active Problem List   Diagnosis   • Allergic rhinitis   • Anxiety   • GERD  (gastroesophageal reflux disease)   • Hyperlipidemia   • Hypertension, benign   • Obsessive compulsive disorder   • Class 1 obesity due to excess calories without serious comorbidity with body mass index (BMI) of 31.0 to 31.9 in adult   • Thyrotoxicosis   • Acute pain of right shoulder   • Heartburn   • Annual visit for general adult medical examination with abnormal findings   • Encounter for CDL (commercial driving license) exam         Current Outpatient Medications:   •  aspirin (ECOTRIN LOW STRENGTH) 81 MG EC tablet, Take  by mouth Daily., Disp: , Rfl:   •  cyclobenzaprine (FLEXERIL) 10 MG tablet, Take 1/2 to 1 tablet nightly as needed, Disp: 30 tablet, Rfl: 5  •  lisinopril (PRINIVIL,ZESTRIL) 20 MG tablet, Take 1 tablet by mouth Daily., Disp: 30 tablet, Rfl: 12  •  meloxicam (MOBIC) 15 MG tablet, Take 15 mg by mouth Daily., Disp: , Rfl:   •  omeprazole (priLOSEC) 40 MG capsule, Take 1 capsule by mouth Daily., Disp: 30 capsule, Rfl: 12  •  PARoxetine (PAXIL) 20 MG tablet, Take 1 tablet by mouth Daily., Disp: 90 tablet, Rfl: 3    Current Facility-Administered Medications:   •  methylPREDNISolone acetate (DEPO-medrol) injection 40 mg, 40 mg, Intramuscular, Once, Fransisco Bell MD    Review of Systems   Constitutional: Negative for chills and diaphoresis.   HENT: Negative for trouble swallowing and voice change.    Eyes: Negative for blurred vision and visual disturbance.   Respiratory: Negative for shortness of breath.    Cardiovascular: Negative for chest pain and palpitations.   Gastrointestinal: Negative for abdominal pain and nausea.   Endocrine: Negative for polydipsia and polyphagia.   Genitourinary: Negative for hematuria.   Musculoskeletal: Negative for neck pain and neck stiffness.   Skin: Negative for color change and pallor.   Allergic/Immunologic: Negative for immunocompromised state.   Neurological: Negative for seizures and syncope.   Hematological: Negative for adenopathy.  "  Psychiatric/Behavioral: Negative for sleep disturbance and suicidal ideas.       I have reviewed and confirmed the accuracy of the ROS as documented by the MA/LPN/RN Fransisco Bell MD      Objective   /88 (BP Location: Right arm, Patient Position: Sitting, Cuff Size: Adult)   Pulse 83   Temp 98 °F (36.7 °C) (Temporal)   Resp 18   Ht 185.4 cm (73\")   Wt 111 kg (244 lb 3.2 oz)   SpO2 97% Comment: room air  BMI 32.22 kg/m²   BP Readings from Last 3 Encounters:   02/24/21 132/88   02/24/21 132/88   02/19/21 133/89     Wt Readings from Last 3 Encounters:   02/24/21 111 kg (244 lb 3.2 oz)   02/24/21 111 kg (244 lb 3.2 oz)   02/19/21 111 kg (243 lb 12.8 oz)     Physical Exam  Constitutional:       Appearance: He is well-developed. He is not diaphoretic.   HENT:      Head: Normocephalic.      Right Ear: Tympanic membrane, ear canal and external ear normal.      Left Ear: Tympanic membrane, ear canal and external ear normal.      Nose: Nose normal.   Eyes:      General: Lids are normal.      Conjunctiva/sclera: Conjunctivae normal.      Pupils: Pupils are equal, round, and reactive to light.   Neck:      Thyroid: No thyromegaly.      Vascular: No carotid bruit or JVD.      Trachea: No tracheal deviation.   Cardiovascular:      Rate and Rhythm: Normal rate and regular rhythm.      Heart sounds: Normal heart sounds. No murmur. No friction rub. No gallop.    Pulmonary:      Effort: Pulmonary effort is normal.      Breath sounds: Normal breath sounds. No stridor. No decreased breath sounds, wheezing or rales.   Abdominal:      General: Bowel sounds are normal. There is no distension.      Palpations: Abdomen is soft. There is no mass.      Tenderness: There is no abdominal tenderness. There is no guarding or rebound.      Hernia: No hernia is present.   Lymphadenopathy:      Head:      Right side of head: No submental, submandibular, tonsillar, preauricular, posterior auricular or occipital adenopathy.      Left " side of head: No submental, submandibular, tonsillar, preauricular, posterior auricular or occipital adenopathy.      Cervical: No cervical adenopathy.   Skin:     General: Skin is warm and dry.      Coloration: Skin is not pale.   Neurological:      Mental Status: He is alert and oriented to person, place, and time.      Cranial Nerves: No cranial nerve deficit.      Sensory: No sensory deficit.      Coordination: Coordination normal.      Gait: Gait normal.      Deep Tendon Reflexes: Reflexes are normal and symmetric.         Data / Lab Results:    No results found for: HGBA1C     No results found for: LDL, LDLDIRECT  No results found for: CHOL  No results found for: TRIG  No results found for: HDL  No results found for: PSA  No results found for: WBC, HGB, HCT, MCV, PLT  No results found for: TSH, W7HOUGD, R9UYWLU   No results found for: GLUCOSE, BUN, CREATININE, EGFRIFNONA, EGFRIFAFRI, BCR, K, CO2, CALCIUM, PROTENTOTREF, ALBUMIN, LABIL2, BILIRUBIN, AST, ALT  No results found for: ROLF, RF, SEDRATE   No results found for: CRP   No results found for: IRON, TIBC, FERRITIN   No results found for: [id number]     Age-appropriate Screening Schedule:  Refer to the list below for future screening recommendations based on patient's age, sex and/or medical conditions. Orders for these recommended tests are listed in the plan section. The patient has been provided with a written plan.    Health Maintenance   Topic Date Due   • TDAP/TD VACCINES (1 - Tdap) 09/04/1995   • LIPID PANEL  02/28/2020   • INFLUENZA VACCINE  11/18/2021 (Originally 8/1/2020)           Assessment/Plan      Medications        Problem List         LOS    Physical.  Doing well.  He agrees to update his tetanus at [facility].  Discussed health maintenance, screening test, lifestyle modification.  GERD.    Much improved today on omeprazole, taking intermittently.  H. Pylori negative.  Discussed diet, lifestyle modification.  Follow-up 3 months.  Consider  right upper quadrant ultrasound if persistent symptoms.  Hypertension.  Improved today back on lisinopril.  Continue baby aspirin daily.  Discussed low-sodium diet.  Follow-up recheck renal function.  Hyperlipidemia.  History of mild elevation.  Discussed diet, signs of lifestyle modification.  Follow-up recheck fasting labs.  Rotator cuff tear.  Complete tear right supraspinatus.  Recommend second opinion from Dr. Fairchild he states he will consider next fall..  Continue PT/rehabilitation exercise discussed.  Improved status post recent injection. Continue NSAIDs.          Diagnoses and all orders for this visit:    1. Annual visit for general adult medical examination with abnormal findings (Primary)    2. Hypertension, benign    3. Class 1 obesity due to excess calories without serious comorbidity with body mass index (BMI) of 31.0 to 31.9 in adult              Expected course, medications, and adverse effects discussed.  Call or return if worsening or persistent symptoms.  I wore protective equipment throughout this patient encounter including a mask, gloves, and eye protection.  Hand hygiene was performed before donning protective equipment and after removal when leaving the room. The complete contents of the Assessment and Plan and Data / Lab Results as documented above have been reviewed and addressed by myself with the patient today as part of an ongoing evaluation / treatment plan.  If some of the documentation has been copied from a previous note and is unchanged it indicates that this problem / plan has been assessed today but is stable from a previous visit and no changes have been recommended.

## 2021-02-24 NOTE — PROGRESS NOTES
Medical Examination      Subjective     Garrison Trent is a 45 y.o. male who presents today for a  fitness determination physical exam. The patient reports no problems.  The following portions of the patient's history were reviewed and updated as appropriate: allergies, current medications, past family history, past medical history, past social history, past surgical history and problem list.    I personally reviewed and updated the patient's allergies, medications, problem list, and past medical, surgical, social, and family history. I have reviewed and confirmed the accuracy of the History of Present Illness and Review of Symptoms as documented by the MA/LPN/RN. Fransisco Bell MD        Family History   Problem Relation Age of Onset   • Gallbladder disease Father    • Heart disease Father         Cardiovascular       Social History     Tobacco Use   • Smoking status: Never Smoker   • Smokeless tobacco: Never Used   • Tobacco comment: No smoke expsosure   Substance Use Topics   • Alcohol use: Yes     Frequency: Monthly or less     Drinks per session: 5 or 6     Binge frequency: Monthly     Comment: Occasional alcohol use   • Drug use: Never       No past surgical history on file.    Patient Active Problem List   Diagnosis   • Allergic rhinitis   • Anxiety   • GERD (gastroesophageal reflux disease)   • Hyperlipidemia   • Hypertension, benign   • Obsessive compulsive disorder   • Class 1 obesity due to excess calories without serious comorbidity with body mass index (BMI) of 31.0 to 31.9 in adult   • Thyrotoxicosis   • Acute pain of right shoulder   • Heartburn   • Annual visit for general adult medical examination with abnormal findings   • Encounter for CDL (commercial driving license) exam         Current Outpatient Medications:   •  aspirin (ECOTRIN LOW STRENGTH) 81 MG EC tablet, Take  by mouth Daily., Disp: , Rfl:   •  cyclobenzaprine (FLEXERIL) 10 MG tablet, Take 1/2 to 1 tablet  "nightly as needed, Disp: 30 tablet, Rfl: 5  •  lisinopril (PRINIVIL,ZESTRIL) 20 MG tablet, Take 1 tablet by mouth Daily., Disp: 30 tablet, Rfl: 12  •  meloxicam (MOBIC) 15 MG tablet, Take 15 mg by mouth Daily., Disp: , Rfl:   •  omeprazole (priLOSEC) 40 MG capsule, Take 1 capsule by mouth Daily., Disp: 30 capsule, Rfl: 12  •  PARoxetine (PAXIL) 20 MG tablet, Take 1 tablet by mouth Daily., Disp: 90 tablet, Rfl: 3    Current Facility-Administered Medications:   •  methylPREDNISolone acetate (DEPO-medrol) injection 40 mg, 40 mg, Intramuscular, Once, Fransisco Bell MD         Review of Systems   Constitutional: Negative for chills and diaphoresis.   HENT: Negative for trouble swallowing and voice change.    Eyes: Negative for visual disturbance.   Respiratory: Negative for shortness of breath.    Cardiovascular: Negative for chest pain and palpitations.   Gastrointestinal: Negative for abdominal pain and nausea.   Endocrine: Negative for polydipsia and polyphagia.   Genitourinary: Negative for hematuria.   Musculoskeletal: Negative for neck stiffness.   Skin: Negative for color change and pallor.   Allergic/Immunologic: Negative for immunocompromised state.   Neurological: Negative for seizures and syncope.   Hematological: Negative for adenopathy.   Psychiatric/Behavioral: Negative for sleep disturbance and suicidal ideas.       I have reviewed and confirmed the accuracy of the ROS as documented by the MA/LPN/RN Fransisco Bell MD      Objective   /88 (BP Location: Right arm, Patient Position: Sitting, Cuff Size: Adult)   Pulse 83   Temp 98 °F (36.7 °C) (Temporal)   Resp 18   Ht 185.4 cm (73\")   Wt 111 kg (244 lb 3.2 oz)   SpO2 97% Comment: room air  BMI 32.22 kg/m²   Wt Readings from Last 3 Encounters:   02/24/21 111 kg (244 lb 3.2 oz)   02/24/21 111 kg (244 lb 3.2 oz)   02/19/21 111 kg (243 lb 12.8 oz)       Vision:   Uncorrected Corrected Horizontal Field of Vision   Right Eye 20/20  >85 degrees   Left " Eye  20/20  >85 degrees   Both Eyes  20/20       Applicant can recognize and distinguish among traffic control signals and devices showing standard red, green, and clari colors.         Monocular Vision?: No    Physical Exam  Constitutional:       Appearance: He is well-developed. He is not diaphoretic.   HENT:      Head: Normocephalic.      Right Ear: Tympanic membrane, ear canal and external ear normal.      Left Ear: Tympanic membrane, ear canal and external ear normal.      Nose: Nose normal.   Eyes:      General: Lids are normal.      Conjunctiva/sclera: Conjunctivae normal.      Pupils: Pupils are equal, round, and reactive to light.   Neck:      Thyroid: No thyromegaly.      Vascular: No carotid bruit or JVD.      Trachea: No tracheal deviation.   Cardiovascular:      Rate and Rhythm: Normal rate and regular rhythm.      Heart sounds: Normal heart sounds. No murmur. No friction rub. No gallop.    Pulmonary:      Effort: Pulmonary effort is normal.      Breath sounds: Normal breath sounds. No stridor. No decreased breath sounds, wheezing or rales.   Abdominal:      General: Bowel sounds are normal. There is no distension.      Palpations: Abdomen is soft. There is no mass.      Tenderness: There is no abdominal tenderness. There is no guarding or rebound.      Hernia: No hernia is present. There is no hernia in the left inguinal area.   Genitourinary:     Penis: Normal.       Scrotum/Testes: Normal.         Right: Mass, tenderness or swelling not present.         Left: Mass, tenderness or swelling not present.   Lymphadenopathy:      Head:      Right side of head: No submental, submandibular, tonsillar, preauricular, posterior auricular or occipital adenopathy.      Left side of head: No submental, submandibular, tonsillar, preauricular, posterior auricular or occipital adenopathy.      Cervical: No cervical adenopathy.      Lower Body: No right inguinal adenopathy. No left inguinal adenopathy.   Skin:      General: Skin is warm and dry.      Coloration: Skin is not pale.   Neurological:      Mental Status: He is alert and oriented to person, place, and time.      Cranial Nerves: No cranial nerve deficit.      Sensory: No sensory deficit.      Motor: No tremor, abnormal muscle tone or seizure activity.      Coordination: Coordination normal.      Gait: Gait normal.      Deep Tendon Reflexes: Reflexes are normal and symmetric.           Assessment/Plan      Medications        Problem List         LOS    CDL physical.  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  Health maintenance.  Doing well.  He agrees to update his tetanus at [facility].  Discussed health maintenance, screening test, lifestyle modification.  GERD.    Much improved today on omeprazole, taking intermittently.  H. Pylori negative.  Discussed diet, lifestyle modification.  Follow-up 3 months.  Consider right upper quadrant ultrasound if persistent symptoms.  Hypertension.  Improved today back on lisinopril.  Continue baby aspirin daily.  Discussed low-sodium diet.  Follow-up recheck renal function.  Hyperlipidemia.  History of mild elevation.  Discussed diet, signs of lifestyle modification.  Follow-up recheck fasting labs.  Rotator cuff tear.  Complete tear right supraspinatus.  Recommend second opinion from Dr. Fairchild he states he will consider next fall..  Continue PT/rehabilitation exercise discussed.  Improved with injection, repeated today.  Continue NSAIDs.            Diagnoses and all orders for this visit:    1. 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 Class 1 obesity due to excess calories without serious comorbidity with body mass index (BMI) of 31.0 to 31.9 in adult              Expected course, medications, and adverse effects discussed.  Call or return if worsening or persistent symptoms.  I wore protective equipment throughout this patient  encounter including a mask, gloves, and eye protection.  Hand hygiene was performed before donning protective equipment and after removal when leaving the room.  The complete contents of the assessment and plan and lab results as documented above have been reviewed and addressed by myself with the patient today as part of an ongoing evaluation / treatment plan.  If some of the documentation has been copied from a previous note and is unchanged it indicates that this problem / plan has been assessed today but is stable from a previous visit and no changes have been recommended.

## 2021-03-15 DIAGNOSIS — F41.9 ANXIETY: ICD-10-CM

## 2021-03-15 RX ORDER — PAROXETINE HYDROCHLORIDE 20 MG/1
TABLET, FILM COATED ORAL
Qty: 90 TABLET | Refills: 0 | Status: SHIPPED | OUTPATIENT
Start: 2021-03-15 | End: 2021-06-07

## 2021-04-05 RX ORDER — MELOXICAM 15 MG/1
TABLET ORAL
Qty: 30 TABLET | Refills: 12 | Status: SHIPPED | OUTPATIENT
Start: 2021-04-05

## 2021-06-07 DIAGNOSIS — F41.9 ANXIETY: ICD-10-CM

## 2021-06-07 RX ORDER — PAROXETINE HYDROCHLORIDE 20 MG/1
TABLET, FILM COATED ORAL
Qty: 90 TABLET | Refills: 0 | Status: SHIPPED | OUTPATIENT
Start: 2021-06-07 | End: 2021-06-09

## 2021-06-09 DIAGNOSIS — F41.9 ANXIETY: ICD-10-CM

## 2021-06-09 RX ORDER — PAROXETINE HYDROCHLORIDE 20 MG/1
TABLET, FILM COATED ORAL
Qty: 90 TABLET | Refills: 0 | Status: SHIPPED | OUTPATIENT
Start: 2021-06-09 | End: 2021-08-19 | Stop reason: SDUPTHER

## 2021-06-18 ENCOUNTER — TELEPHONE (OUTPATIENT)
Dept: FAMILY MEDICINE CLINIC | Facility: CLINIC | Age: 46
End: 2021-06-18

## 2021-06-18 NOTE — TELEPHONE ENCOUNTER
I will   check and see if sent if not I  will check , but he should have turned it in to DMV  also.

## 2021-06-18 NOTE — TELEPHONE ENCOUNTER
Caller: Twan, Garrison    Relationship: Self    Best call back number: [phone] (H)    What form or medical record are you requesting: DOT PHYSICAL TO THE [employer]     Who is requesting this form or medical record from you: EMPLOYER    How would you like to receive the form or medical records (pick-up, mail, fax): FAX TO THE [employer]     Timeframe paperwork needed: ASAP    Additional notes: PATIENT CALLED AND STATES THAT HE RECEIVED A LETTER FROM THE [employer] STATING THAT HIS LICENSE HAS BEEN SUSPENDED DUE TO THEM NOT RECEIVING HIS DOT PHYSICAL PAPERWORK.      THANKS

## 2021-08-19 DIAGNOSIS — F41.9 ANXIETY: ICD-10-CM

## 2021-08-19 NOTE — TELEPHONE ENCOUNTER
Caller: Garrison Trent    Relationship: Self    Best call back number: [phone]    Medication needed:   Requested Prescriptions     Pending Prescriptions Disp Refills   • PARoxetine (PAXIL) 20 MG tablet 90 tablet 0     Sig: Take 1 tablet by mouth Daily.       When do you need the refill by: ASAP    What additional details did the patient provide when requesting the medication: OUT OF MEDICINE--PATIENT IS NEEDING TO KNOW WHY HE CAN'T GET A FULL YEAR OF REFILLS ANYMORE. PLEASE ADVISE.    Does the patient have less than a 3 day supply:  [x] Yes  [] No    What is the patient's preferred pharmacy: [facility] [place] [facility] [address] - [phone] [facility] [phone] FX

## 2021-08-20 RX ORDER — PAROXETINE HYDROCHLORIDE 20 MG/1
20 TABLET, FILM COATED ORAL DAILY
Qty: 90 TABLET | Refills: 0 | Status: SHIPPED | OUTPATIENT
Start: 2021-08-20 | End: 2021-11-18

## 2021-11-18 DIAGNOSIS — F41.9 ANXIETY: ICD-10-CM

## 2021-11-18 RX ORDER — PAROXETINE HYDROCHLORIDE 20 MG/1
TABLET, FILM COATED ORAL
Qty: 90 TABLET | Refills: 0 | Status: SHIPPED | OUTPATIENT
Start: 2021-11-18 | End: 2021-12-08 | Stop reason: SDUPTHER

## 2021-12-08 ENCOUNTER — OFFICE VISIT (OUTPATIENT)
Dept: FAMILY MEDICINE CLINIC | Facility: CLINIC | Age: 46
End: 2021-12-08

## 2021-12-08 ENCOUNTER — TELEPHONE (OUTPATIENT)
Dept: FAMILY MEDICINE CLINIC | Facility: CLINIC | Age: 46
End: 2021-12-08

## 2021-12-08 VITALS
HEIGHT: 73 IN | SYSTOLIC BLOOD PRESSURE: 162 MMHG | DIASTOLIC BLOOD PRESSURE: 100 MMHG | RESPIRATION RATE: 18 BRPM | WEIGHT: 233.2 LBS | BODY MASS INDEX: 30.91 KG/M2 | TEMPERATURE: 97.2 F | OXYGEN SATURATION: 98 % | HEART RATE: 126 BPM

## 2021-12-08 DIAGNOSIS — R12 HEARTBURN: ICD-10-CM

## 2021-12-08 DIAGNOSIS — E78.2 MIXED HYPERLIPIDEMIA: ICD-10-CM

## 2021-12-08 DIAGNOSIS — I10 HYPERTENSION, BENIGN: ICD-10-CM

## 2021-12-08 DIAGNOSIS — F41.9 ANXIETY: Primary | ICD-10-CM

## 2021-12-08 PROCEDURE — 99213 OFFICE O/P EST LOW 20 MIN: CPT | Performed by: FAMILY MEDICINE

## 2021-12-08 RX ORDER — OMEPRAZOLE 40 MG/1
CAPSULE, DELAYED RELEASE ORAL
Qty: 30 CAPSULE | Refills: 0 | Status: SHIPPED | OUTPATIENT
Start: 2021-12-08

## 2021-12-08 RX ORDER — LORAZEPAM 0.5 MG/1
TABLET ORAL
Qty: 30 TABLET | Refills: 0 | Status: SHIPPED | OUTPATIENT
Start: 2021-12-08 | End: 2022-01-28 | Stop reason: SDUPTHER

## 2021-12-08 RX ORDER — PAROXETINE 30 MG/1
30 TABLET, FILM COATED ORAL DAILY
Qty: 30 TABLET | Refills: 2 | Status: SHIPPED | OUTPATIENT
Start: 2021-12-08 | End: 2022-02-02

## 2021-12-08 NOTE — PROGRESS NOTES
Subjective   Garrison Trent is a 46 y.o. male.     Chief Complaint   Patient presents with   • Anxiety       Anxiety  Presents for follow-up visit. Symptoms include decreased concentration, depressed mood, excessive worry, insomnia, irritability and nervous/anxious behavior. Patient reports no chest pain, nausea, palpitations, shortness of breath or suicidal ideas. The severity of symptoms is severe, causing significant distress and interfering with daily activities. The quality of sleep is fair.                I personally reviewed and updated the patient's allergies, medications, problem list, and past medical, surgical, social, and family history. I have reviewed and confirmed the accuracy of the History of Present Illness and Review of Symptoms as documented by the MA/LPN/RN. Fransisco Bell MD    Family History   Problem Relation Age of Onset   • Gallbladder disease Father    • Heart disease Father         Cardiovascular       Social History     Tobacco Use   • Smoking status: Never Smoker   • Smokeless tobacco: Never Used   • Tobacco comment: No smoke expsosure   Substance Use Topics   • Alcohol use: Yes     Comment: Occasional alcohol use   • Drug use: Never       History reviewed. No pertinent surgical history.    Patient Active Problem List   Diagnosis   • Allergic rhinitis   • Anxiety   • GERD (gastroesophageal reflux disease)   • Hyperlipidemia   • Hypertension, benign   • Obsessive compulsive disorder   • Class 1 obesity due to excess calories without serious comorbidity with body mass index (BMI) of 31.0 to 31.9 in adult   • Thyrotoxicosis   • Acute pain of right shoulder   • Heartburn   • Annual visit for general adult medical examination with abnormal findings   • Encounter for CDL (commercial driving license) exam         Current Outpatient Medications:   •  aspirin (ECOTRIN LOW STRENGTH) 81 MG EC tablet, Take  by mouth Daily., Disp: , Rfl:   •  cyclobenzaprine (FLEXERIL) 10 MG tablet, Take 1/2 to 1  "tablet nightly as needed, Disp: 30 tablet, Rfl: 5  •  lisinopril (PRINIVIL,ZESTRIL) 20 MG tablet, Take 1 tablet by mouth Daily., Disp: 30 tablet, Rfl: 12  •  meloxicam (MOBIC) 15 MG tablet, Take 1 tablet by mouth once daily, Disp: 30 tablet, Rfl: 12  •  PARoxetine (PAXIL) 30 MG tablet, Take 1 tablet by mouth Daily., Disp: 30 tablet, Rfl: 2  •  LORazepam (Ativan) 0.5 MG tablet, Take 1 tablet every 4-6 hours as needed anxiety, Disp: 30 tablet, Rfl: 0  •  omeprazole (priLOSEC) 40 MG capsule, Take 1 capsule by mouth once daily, Disp: 30 capsule, Rfl: 0    Current Facility-Administered Medications:   •  methylPREDNISolone acetate (DEPO-medrol) injection 40 mg, 40 mg, Intramuscular, Once, Fransisco Bell MD         Review of Systems   Constitutional: Positive for irritability. Negative for chills and diaphoresis.   Eyes: Negative for visual disturbance.   Respiratory: Negative for shortness of breath.    Cardiovascular: Negative for chest pain and palpitations.   Gastrointestinal: Negative for abdominal pain and nausea.   Endocrine: Negative for polydipsia and polyphagia.   Musculoskeletal: Negative for neck stiffness.   Skin: Negative for color change and pallor.   Neurological: Negative for seizures and syncope.   Hematological: Negative for adenopathy.   Psychiatric/Behavioral: Positive for decreased concentration and depressed mood. Negative for hallucinations and suicidal ideas. The patient is nervous/anxious and has insomnia.        I have reviewed and confirmed the accuracy of the ROS as documented by the MA/LPN/RN Fransisco Bell MD      Objective   /100   Pulse (!) 126   Temp 97.2 °F (36.2 °C)   Resp 18   Ht 185.4 cm (73\")   Wt 106 kg (233 lb 3.2 oz)   SpO2 98%   BMI 30.77 kg/m²   BP Readings from Last 3 Encounters:   12/08/21 162/100   02/24/21 132/88   02/24/21 132/88     Wt Readings from Last 3 Encounters:   12/08/21 106 kg (233 lb 3.2 oz)   02/24/21 111 kg (244 lb 3.2 oz)   02/24/21 111 kg (244 lb " 3.2 oz)     Physical Exam  Constitutional:       Appearance: Normal appearance. He is well-developed. He is not diaphoretic.   Cardiovascular:      Rate and Rhythm: Normal rate and regular rhythm.      Pulses: Normal pulses.      Heart sounds: Normal heart sounds, S1 normal and S2 normal. No murmur heard.  No friction rub. No gallop.    Pulmonary:      Effort: Pulmonary effort is normal. No accessory muscle usage.      Breath sounds: Normal breath sounds. No stridor. No decreased breath sounds, wheezing, rhonchi or rales.   Abdominal:      General: Bowel sounds are normal. There is no distension.      Palpations: Abdomen is soft. Abdomen is not rigid. There is no mass or pulsatile mass.      Tenderness: There is no abdominal tenderness. There is no guarding or rebound. Negative signs include Johnson's sign.      Hernia: No hernia is present.   Skin:     General: Skin is warm and dry.      Coloration: Skin is not pale.   Neurological:      Mental Status: He is alert and oriented to person, place, and time.      Coordination: Coordination normal.      Gait: Gait normal.         Data / Lab Results:    No results found for: HGBA1C     No results found for: LDL, LDLDIRECT  No results found for: CHOL  No results found for: TRIG  No results found for: HDL  No results found for: PSA  No results found for: WBC, HGB, HCT, MCV, PLT  No results found for: TSH, Y6RAFIW, I8BQTBD   No results found for: GLUCOSE, BUN, CREATININE, EGFRIFNONA, EGFRIFAFRI, BCR, K, CO2, CALCIUM, PROTENTOTREF, ALBUMIN, LABIL2, BILIRUBIN, AST, ALT  No results found for: ROLF, RF, SEDRATE   No results found for: CRP   No results found for: IRON, TIBC, FERRITIN   No results found for: [id number]       Assessment/Plan      Medications        Problem List         [facility] physical.  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  Health maintenance.  Doing well.  He agrees to update his tetanus at [employer].  Discussed health  maintenance, screening test, lifestyle modification.  GERD.    Much improved today on omeprazole, taking intermittently.  H. Pylori negative.  Discussed diet, lifestyle modification.  Follow-up 3 months.  Consider right upper quadrant ultrasound if persistent symptoms.  Hypertension.  Improved today back on lisinopril.  Continue baby aspirin daily.  Discussed low-sodium diet.  Follow-up recheck renal function.  Hyperlipidemia.  History of mild elevation.  Discussed diet, signs of lifestyle modification.  Follow-up recheck fasting labs.  Rotator cuff tear.  Complete tear right supraspinatus.  Recommend second opinion from Dr. Fairchild he states he will consider next fall..  Continue PT/rehabilitation exercise discussed.  Improved with injection, repeated today.  Continue NSAIDs.    Anxiety.  Worse/flare currently secondary to acute life stressor.  Rx for short-term Ativan written.  Do not drive while taking.  Increase Paxil.  Good social support.  Follow-up 4 to 6 weeks.  Use Ativan sparingly.      Diagnoses and all orders for this visit:    1. Anxiety (Primary)  -     LORazepam (Ativan) 0.5 MG tablet; Take 1 tablet every 4-6 hours as needed anxiety  Dispense: 30 tablet; Refill: 0  -     PARoxetine (PAXIL) 30 MG tablet; Take 1 tablet by mouth Daily.  Dispense: 30 tablet; Refill: 2    2. Hypertension, benign    3. Mixed hyperlipidemia              Expected course, medications, and adverse effects discussed.  Call or return if worsening or persistent symptoms.  I wore protective equipment throughout this patient encounter including a mask, gloves, and eye protection.  Hand hygiene was performed before donning protective equipment and after removal when leaving the room. The complete contents of the Assessment and Plan and Data/Lab Results as documented above have been reviewed and addressed by myself with the patient today as part of an ongoing evaluation / treatment plan.  If some of the documentation has been copied  from a previous note and is unchanged it indicates that this problem / plan has been assessed today but is stable from a previous visit and no changes have been recommended.

## 2021-12-08 NOTE — TELEPHONE ENCOUNTER
Caller: Garrison Trent    Relationship: Self    Best call back number: [phone]    What is the best time to reach you: 10:30-12:30    Who are you requesting to speak with (clinical staff, provider,  specific staff member): NURSE    Do you know the name of the person who called: PATIENT     What was the call regarding: PATIENT IS HAVING ANXIETY    Do you require a callback: YES

## 2021-12-08 NOTE — TELEPHONE ENCOUNTER
Caller: Garrison Trent    Relationship: Self    Best call back number: [phone]    What medication are you requesting: ANXIETY MEDS    What are your current symptoms: BAD ANXIETY    How long have you been experiencing symptoms: TWO DAYS, PATIENT RECEIVED BAD NEWS. DID NOT GO INTO MORE DETAIL.    Have you had these symptoms before:    [x] Yes  [] No    Have you been treated for these symptoms before:   [] Yes  [x] No    If a prescription is needed, what is your preferred pharmacy and phone number: [facility] - [place], [place] - [address] [facility] [address]  - [phone] [facility] [phone] FX     Additional notes: PLEASE CALL PATIENT TO DISCUSS.

## 2022-01-28 DIAGNOSIS — F41.9 ANXIETY: ICD-10-CM

## 2022-01-28 RX ORDER — LORAZEPAM 0.5 MG/1
0.5 TABLET ORAL 3 TIMES DAILY PRN
Qty: 30 TABLET | Refills: 0 | Status: SHIPPED | OUTPATIENT
Start: 2022-01-28 | End: 2022-02-09 | Stop reason: SDUPTHER

## 2022-01-28 RX ORDER — LORAZEPAM 0.5 MG/1
TABLET ORAL
Qty: 30 TABLET | Refills: 0 | Status: SHIPPED | OUTPATIENT
Start: 2022-01-28 | End: 2022-01-28 | Stop reason: SDUPTHER

## 2022-01-28 NOTE — TELEPHONE ENCOUNTER
Caller: Garrison Trent     Relationship: Self     Best call back number: [phone] (H)     Requested Prescriptions:   Requested Prescriptions             Pending Prescriptions Disp Refills   • LORazepam (Ativan) 0.5 MG tablet 30 tablet 0       Sig: Take 1 tablet every 4-6 hours as needed anxiety         Pharmacy where request should be sent: [facility][id number] - [place], [place] - [address] [place] [address] [address] [facility] [address] - [phone]  - [phone] [phone]     Additional details provided by patient: PATIENT IS COMPLETELY OUT AND HAVING A BAD SPELL.     NEXT APPOINTMENT 02/02/22 FOR ANXIETY.  PLEASE GIVE PATIENT A CALL BACK ABOUT THIS REFILL REQUEST      Does the patient have less than a 3 day supply:  [x]? Yes  []? No     Arsh Santos, PCT   01/28/22 11:21 EST

## 2022-01-28 NOTE — TELEPHONE ENCOUNTER
PATIENT IS CALLING IN TO CHECK ON THE STATUS OF HIS LORAZEPAM 0.5MG.  HE IS OUT AND WANTS TO MAKE SURE THAT THIS IS SENT TO THE [facility]  [address] [place] [address]  [phone]    PATIENT CALL BACK  [phone]

## 2022-01-28 NOTE — TELEPHONE ENCOUNTER
Caller: Garrison Trent    Relationship: Self    Best call back number: [phone] (H)    Requested Prescriptions:   Requested Prescriptions     Pending Prescriptions Disp Refills   • LORazepam (Ativan) 0.5 MG tablet 30 tablet 0     Sig: Take 1 tablet every 4-6 hours as needed anxiety        Pharmacy where request should be sent: [facility] [address] [place] [address]   - [phone] [facility] [phone] FX     Additional details provided by patient: PATIENT IS COMPLETELY OUT AND HAVING A BAD SPELL.    NEXT APPOINTMENT 02/02/22 FOR ANXIETY.  PLEASE GIVE PATIENT A CALL BACK ABOUT THIS REFILL REQUEST     Does the patient have less than a 3 day supply:  [x] Yes  [] No    MARILU Lindsey   01/28/22 08:18 EST

## 2022-01-28 NOTE — TELEPHONE ENCOUNTER
Caller: Garrison Trent    Relationship: Self    Best call back number: [phone]     What medications are you currently taking:   Current Outpatient Medications on File Prior to Visit   Medication Sig Dispense Refill   • aspirin (ECOTRIN LOW STRENGTH) 81 MG EC tablet Take  by mouth Daily.     • cyclobenzaprine (FLEXERIL) 10 MG tablet Take 1/2 to 1 tablet nightly as needed 30 tablet 5   • lisinopril (PRINIVIL,ZESTRIL) 20 MG tablet Take 1 tablet by mouth Daily. 30 tablet 12   • LORazepam (Ativan) 0.5 MG tablet Take 1 tablet every 4-6 hours as needed anxiety 30 tablet 0   • meloxicam (MOBIC) 15 MG tablet Take 1 tablet by mouth once daily 30 tablet 12   • omeprazole (priLOSEC) 40 MG capsule Take 1 capsule by mouth once daily 30 capsule 0   • PARoxetine (PAXIL) 30 MG tablet Take 1 tablet by mouth Daily. 30 tablet 2   • [DISCONTINUED] LORazepam (Ativan) 0.5 MG tablet Take 1 tablet every 4-6 hours as needed anxiety 30 tablet 0     Current Facility-Administered Medications on File Prior to Visit   Medication Dose Route Frequency Provider Last Rate Last Admin   • methylPREDNISolone acetate (DEPO-medrol) injection 40 mg  40 mg Intramuscular Once Fransisco Bell MD            ADDITIONAL NOTES:    INSURANCE WILL NOT PAY FOR THE PRESCRIPTION LORazepam (Ativan) 0.5 MG tablet AS IT IS WRITTEN.  IT NEEDS TO SAY TAKE 1 TABLET 3 TIMES A DAY WITH THE CORRECT AMOUNT OR TAKE THE TAKE 1 TABLET EVERY 4-6 HOURS OFF OF THE SCRIPT.    THE QUANTITY AND THE DAILY DOSAGE HAS TO BE BALANCE OUT.  SO A NEW SCRIPT NEEDS TO BE SENT TO THE PHARMACY.  SO IT SHOULD BE 3 TABLETS DAILY FOR 5 DAYS EQUALS 15 TABLETS.    [facility][id number] - [facility] [place]  [address] [place] [address] [address] [facility] [address] [facility] [address] - [phone] [facility] [phone] FX    PATIENT NEEDS THIS SCRIPT TODAY HE IS CURRENTLY AT THE PHARMACY WAITING.

## 2022-02-01 NOTE — PROGRESS NOTES
Subjective   Garrison Trent is a 46 y.o. male.     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    Anxiety  Presents for follow-up visit. Symptoms include decreased concentration, depressed mood, excessive worry, insomnia, irritability and nervous/anxious behavior. Patient reports no chest pain, nausea, palpitations, shortness of breath or suicidal ideas. The severity of symptoms is severe, causing significant distress and interfering with daily activities. The patient sleeps 8 hours per night. The quality of sleep is fair. Nighttime awakenings: none.     Compliance with medications is %.            I personally reviewed and updated the patient's allergies, medications, problem list, and past medical, surgical, social, and family history. I have reviewed and confirmed the accuracy of the History of Present Illness and Review of Symptoms as documented by the MA/LPN/RN. Fransisco Bell MD    Family History   Problem Relation Age of Onset   • Gallbladder disease Father    • Heart disease Father         Cardiovascular       Social History     Tobacco Use   • Smoking status: Never Smoker   • Smokeless tobacco: Never Used   • Tobacco comment: No smoke expsosure   Vaping Use   • Vaping Use: Never used   Substance Use Topics   • Alcohol use: Yes     Comment: Occasional alcohol use   • Drug use: Never       History reviewed. No pertinent surgical history.    Patient Active Problem List   Diagnosis   • Allergic rhinitis   • Anxiety   • GERD (gastroesophageal reflux disease)   • Hyperlipidemia   • Hypertension, benign   • Obsessive compulsive disorder   • Class 1 obesity due to excess calories without serious comorbidity with body mass index (BMI) of 31.0 to 31.9 in adult   • Thyrotoxicosis   • Acute pain of right shoulder   • Heartburn   • Annual visit for general  "adult medical examination with abnormal findings   • Encounter for CDL (commercial driving license) exam   • Atypical fibroxanthoma of skin         Current Outpatient Medications:   •  aspirin (ECOTRIN LOW STRENGTH) 81 MG EC tablet, Take  by mouth Daily., Disp: , Rfl:   •  cyclobenzaprine (FLEXERIL) 10 MG tablet, Take 1/2 to 1 tablet nightly as needed, Disp: 30 tablet, Rfl: 5  •  lisinopril (PRINIVIL,ZESTRIL) 20 MG tablet, Take 1 tablet by mouth Daily., Disp: 30 tablet, Rfl: 12  •  LORazepam (Ativan) 0.5 MG tablet, Take 1 tablet by mouth 3 (Three) Times a Day As Needed for Anxiety., Disp: 30 tablet, Rfl: 0  •  meloxicam (MOBIC) 15 MG tablet, Take 1 tablet by mouth once daily, Disp: 30 tablet, Rfl: 12  •  omeprazole (priLOSEC) 40 MG capsule, Take 1 capsule by mouth once daily, Disp: 30 capsule, Rfl: 0  •  PARoxetine (PAXIL) 40 MG tablet, Take 1 tablet by mouth Daily., Disp: 30 tablet, Rfl: 5  No current facility-administered medications for this visit.         Review of Systems   Constitutional: Positive for irritability. Negative for chills and diaphoresis.   Eyes: Negative for visual disturbance.   Respiratory: Negative for shortness of breath.    Cardiovascular: Negative for chest pain and palpitations.   Gastrointestinal: Negative for abdominal pain and nausea.   Endocrine: Negative for polydipsia and polyphagia.   Musculoskeletal: Negative for neck stiffness.   Skin: Negative for color change and pallor.   Neurological: Negative for seizures and syncope.   Hematological: Negative for adenopathy.   Psychiatric/Behavioral: Positive for decreased concentration and depressed mood. Negative for hallucinations and suicidal ideas. The patient is nervous/anxious and has insomnia.        I have reviewed and confirmed the accuracy of the ROS as documented by the MA/LPN/RN Fransisco Bell MD      Objective   /80   Pulse 120   Temp 98.4 °F (36.9 °C)   Resp 18   Ht 185.4 cm (73\")   Wt 106 kg (233 lb)   SpO2 98%   " BMI 30.74 kg/m²   BP Readings from Last 3 Encounters:   02/02/22 120/80   12/08/21 162/100   02/24/21 132/88     Wt Readings from Last 3 Encounters:   02/02/22 106 kg (233 lb)   12/08/21 106 kg (233 lb 3.2 oz)   02/24/21 111 kg (244 lb 3.2 oz)     Physical Exam  Constitutional:       Appearance: Normal appearance. He is well-developed. He is not diaphoretic.   Cardiovascular:      Rate and Rhythm: Normal rate and regular rhythm.      Pulses: Normal pulses.      Heart sounds: Normal heart sounds, S1 normal and S2 normal. No murmur heard.  No friction rub. No gallop.    Pulmonary:      Effort: Pulmonary effort is normal. No accessory muscle usage.      Breath sounds: Normal breath sounds. No stridor. No decreased breath sounds, wheezing, rhonchi or rales.   Abdominal:      General: Bowel sounds are normal. There is no distension.      Palpations: Abdomen is soft. Abdomen is not rigid. There is no mass or pulsatile mass.      Tenderness: There is no abdominal tenderness. There is no guarding or rebound. Negative signs include Johnson's sign.      Hernia: No hernia is present.   Skin:     General: Skin is warm and dry.      Coloration: Skin is not pale.   Neurological:      Mental Status: He is alert and oriented to person, place, and time.      Coordination: Coordination normal.      Gait: Gait normal.         Data / Lab Results:    No results found for: HGBA1C     No results found for: LDL, LDLDIRECT  No results found for: CHOL  No results found for: TRIG  No results found for: HDL  No results found for: PSA  No results found for: WBC, HGB, HCT, MCV, PLT  No results found for: TSH, U5MKPRE, C2JIRLF   No results found for: GLUCOSE, BUN, CREATININE, EGFRIFNONA, EGFRIFAFRI, BCR, K, CO2, CALCIUM, PROTENTOTREF, ALBUMIN, LABIL2, BILIRUBIN, AST, ALT  No results found for: ROLF, RF, SEDRATE   No results found for: CRP   No results found for: IRON, TIBC, FERRITIN   No results found for: [id number]       Assessment/Plan       Medications        Problem List         LOS        Health maintenance.  Doing well.  He agrees to update his tetanus at [facility].  Discussed health maintenance, screening test, lifestyle modification.  GERD.    Much improved today on omeprazole, taking intermittently.  H. Pylori negative.  Discussed diet, lifestyle modification.  Follow-up 3 months.  Consider right upper quadrant ultrasound if persistent symptoms.  Hypertension.  Improved today back on lisinopril.  Continue baby aspirin daily.  Discussed low-sodium diet.  Follow-up recheck renal function.  Hyperlipidemia.  History of mild elevation.  Discussed diet, signs of lifestyle modification.  Follow-up recheck fasting labs.  Rotator cuff tear.  Complete tear right supraspinatus.  Recommend second opinion from Dr. Fairchild he states he will consider next fall..  Continue PT/rehabilitation exercise discussed.  Improved with injection, repeated today.  Continue NSAIDs.  Left shoulder pain.  Likely secondary rotator cuff tendinitis.  Ice, NSAIDs, rehabilitation exercise discussed.  Consider injection if persistent symptoms.  Anxiety.  improved today.  Overall worse/flare currently secondary to acute life stressor.  Rx for short-term Ativan written.  Do not drive while taking.  Increase Paxil.  Good social support.  Follow-up 4 to 6 weeks.  Use Ativan sparingly.  Atypical fibroxanthoma.  Clinically improved today status post resection/Mohs.  Followed by dermatology.  Sun protection discussed.  Follow-up for regular skin exams.  CDL physical.  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    Diagnoses and all orders for this visit:    1. Anxiety (Primary)  -     PARoxetine (PAXIL) 40 MG tablet; Take 1 tablet by mouth Daily.  Dispense: 30 tablet; Refill: 5    2. Class 1 obesity due to excess calories without serious comorbidity with body mass index (BMI) of 31.0 to 31.9 in adult    3. Atypical fibroxanthoma of skin              Expected  course, medications, and adverse effects discussed.  Call or return if worsening or persistent symptoms.  I wore protective equipment throughout this patient encounter including a mask, gloves, and eye protection.  Hand hygiene was performed before donning protective equipment and after removal when leaving the room. The complete contents of the Assessment and Plan and Data/Lab Results as documented above have been reviewed and addressed by myself with the patient today as part of an ongoing evaluation / treatment plan.  If some of the documentation has been copied from a previous note and is unchanged it indicates that this problem / plan has been assessed today but is stable from a previous visit and no changes have been recommended.

## 2022-02-02 ENCOUNTER — OFFICE VISIT (OUTPATIENT)
Dept: FAMILY MEDICINE CLINIC | Facility: CLINIC | Age: 47
End: 2022-02-02

## 2022-02-02 VITALS
WEIGHT: 233 LBS | RESPIRATION RATE: 18 BRPM | BODY MASS INDEX: 30.88 KG/M2 | DIASTOLIC BLOOD PRESSURE: 80 MMHG | HEIGHT: 73 IN | HEART RATE: 120 BPM | SYSTOLIC BLOOD PRESSURE: 120 MMHG | TEMPERATURE: 98.4 F | OXYGEN SATURATION: 98 %

## 2022-02-02 DIAGNOSIS — E66.09 CLASS 1 OBESITY DUE TO EXCESS CALORIES WITHOUT SERIOUS COMORBIDITY WITH BODY MASS INDEX (BMI) OF 31.0 TO 31.9 IN ADULT: ICD-10-CM

## 2022-02-02 DIAGNOSIS — F41.9 ANXIETY: Primary | ICD-10-CM

## 2022-02-02 DIAGNOSIS — D48.5 ATYPICAL FIBROXANTHOMA OF SKIN: ICD-10-CM

## 2022-02-02 PROCEDURE — 99213 OFFICE O/P EST LOW 20 MIN: CPT | Performed by: FAMILY MEDICINE

## 2022-02-02 RX ORDER — PAROXETINE HYDROCHLORIDE 40 MG/1
40 TABLET, FILM COATED ORAL DAILY
Qty: 30 TABLET | Refills: 5 | Status: SHIPPED | OUTPATIENT
Start: 2022-02-02 | End: 2022-08-29

## 2022-02-07 ENCOUNTER — TELEPHONE (OUTPATIENT)
Dept: FAMILY MEDICINE CLINIC | Facility: CLINIC | Age: 47
End: 2022-02-07

## 2022-02-07 DIAGNOSIS — F41.9 ANXIETY: ICD-10-CM

## 2022-02-07 RX ORDER — LORAZEPAM 0.5 MG/1
0.5 TABLET ORAL 3 TIMES DAILY PRN
Qty: 30 TABLET | Refills: 0 | Status: CANCELLED | OUTPATIENT
Start: 2022-02-07

## 2022-02-07 NOTE — TELEPHONE ENCOUNTER
Caller: Garrison Trent    Relationship: Self    Best call back number: [phone]   Requested Prescriptions:   Requested Prescriptions     Pending Prescriptions Disp Refills   • LORazepam (Ativan) 0.5 MG tablet 30 tablet 0     Sig: Take 1 tablet by mouth 3 (Three) Times a Day As Needed for Anxiety.        Pharmacy where request should be sent: [facility] #[id number] - [facility] [place] [address] [facility] [address] [phone] [place] [phone] FX     Additional details provided by patient:     COMPLETELY OUT OF THIS MEDIATION    PLEASE CALL PATIENT AND ADVISE WENT SENT TO THE PHARMACY.    Does the patient have less than a 3 day supply:  [x] Yes  [] No    Cody Harper Rep   02/07/22 10:07 EST

## 2022-02-09 DIAGNOSIS — F41.9 ANXIETY: ICD-10-CM

## 2022-02-09 RX ORDER — LORAZEPAM 0.5 MG/1
0.5 TABLET ORAL 3 TIMES DAILY PRN
Qty: 30 TABLET | Refills: 0 | Status: SHIPPED | OUTPATIENT
Start: 2022-02-09 | End: 2022-04-15 | Stop reason: SDUPTHER

## 2022-02-09 NOTE — TELEPHONE ENCOUNTER
Garrison says he has been taking the increased paxil, but he isnt feeling better. He said he did mess up the previous script and was taking them every 4-6 hrs. He thought that was what the pharmacy told him. He does know now he should use it three times a day and sparingly. He spoke to you about this on 2/2/22 at his appointment and thought you would be sending in another script. He is asking if you can do it for more than 30 also.

## 2022-02-09 NOTE — TELEPHONE ENCOUNTER
Let him know that I went ahead and send 30 more for him but this is one that he needs to be really careful with, if he takes it too regularly then it can be habit forming, should try to spread out on the use it if his anxiety is severe, do not expect him to use it every day, hopefully the Paxil start to help him in the next few days, thanks

## 2022-03-23 ENCOUNTER — OFFICE VISIT (OUTPATIENT)
Dept: FAMILY MEDICINE CLINIC | Facility: CLINIC | Age: 47
End: 2022-03-23

## 2022-03-23 VITALS
HEART RATE: 89 BPM | RESPIRATION RATE: 18 BRPM | WEIGHT: 232 LBS | TEMPERATURE: 97.8 F | DIASTOLIC BLOOD PRESSURE: 90 MMHG | OXYGEN SATURATION: 95 % | HEIGHT: 73 IN | BODY MASS INDEX: 30.75 KG/M2 | SYSTOLIC BLOOD PRESSURE: 150 MMHG

## 2022-03-23 DIAGNOSIS — I10 HYPERTENSION, BENIGN: ICD-10-CM

## 2022-03-23 DIAGNOSIS — R52 PAIN: ICD-10-CM

## 2022-03-23 DIAGNOSIS — E78.2 MIXED HYPERLIPIDEMIA: ICD-10-CM

## 2022-03-23 DIAGNOSIS — Z00.01 ANNUAL VISIT FOR GENERAL ADULT MEDICAL EXAMINATION WITH ABNORMAL FINDINGS: Primary | ICD-10-CM

## 2022-03-23 DIAGNOSIS — F41.9 ANXIETY: ICD-10-CM

## 2022-03-23 DIAGNOSIS — E66.09 CLASS 1 OBESITY DUE TO EXCESS CALORIES WITHOUT SERIOUS COMORBIDITY WITH BODY MASS INDEX (BMI) OF 31.0 TO 31.9 IN ADULT: ICD-10-CM

## 2022-03-23 PROCEDURE — 99213 OFFICE O/P EST LOW 20 MIN: CPT | Performed by: FAMILY MEDICINE

## 2022-03-23 PROCEDURE — 99396 PREV VISIT EST AGE 40-64: CPT | Performed by: FAMILY MEDICINE

## 2022-03-23 RX ORDER — CYCLOBENZAPRINE HCL 10 MG
TABLET ORAL
Qty: 30 TABLET | Refills: 5 | Status: SHIPPED | OUTPATIENT
Start: 2022-03-23

## 2022-04-15 ENCOUNTER — TELEPHONE (OUTPATIENT)
Dept: FAMILY MEDICINE CLINIC | Facility: CLINIC | Age: 47
End: 2022-04-15

## 2022-04-15 ENCOUNTER — OFFICE VISIT (OUTPATIENT)
Dept: FAMILY MEDICINE CLINIC | Facility: CLINIC | Age: 47
End: 2022-04-15

## 2022-04-15 VITALS — BODY MASS INDEX: 30.75 KG/M2 | HEIGHT: 73 IN | WEIGHT: 232 LBS

## 2022-04-15 DIAGNOSIS — E66.09 CLASS 1 OBESITY DUE TO EXCESS CALORIES WITHOUT SERIOUS COMORBIDITY WITH BODY MASS INDEX (BMI) OF 31.0 TO 31.9 IN ADULT: ICD-10-CM

## 2022-04-15 DIAGNOSIS — F41.9 ANXIETY: Primary | ICD-10-CM

## 2022-04-15 PROCEDURE — 99443 PR PHYS/QHP TELEPHONE EVALUATION 21-30 MIN: CPT | Performed by: FAMILY MEDICINE

## 2022-04-15 RX ORDER — LORAZEPAM 1 MG/1
TABLET ORAL
Qty: 30 TABLET | Refills: 0 | Status: SHIPPED | OUTPATIENT
Start: 2022-04-15

## 2022-04-15 RX ORDER — CEPHALEXIN 500 MG/1
500 CAPSULE ORAL 3 TIMES DAILY
Qty: 30 CAPSULE | Refills: 0 | Status: SHIPPED | OUTPATIENT
Start: 2022-04-15 | End: 2022-04-15

## 2022-04-15 NOTE — PROGRESS NOTES
Subjective   Garrison Trent is a 46 y.o. male.     Chief Complaint   Patient presents with   • Anxiety       Anxiety  Presents for follow-up visit. Symptoms include decreased concentration, depressed mood, excessive worry, insomnia, irritability and nervous/anxious behavior. Patient reports no chest pain, nausea, palpitations, shortness of breath or suicidal ideas. The severity of symptoms is severe, causing significant distress and interfering with daily activities. The patient sleeps 8 hours per night. The quality of sleep is fair. Nighttime awakenings: none.     Compliance with medications is %.            I personally reviewed and updated the patient's allergies, medications, problem list, and past medical, surgical, social, and family history. I have reviewed and confirmed the accuracy of the History of Present Illness and Review of Symptoms as documented by the MA/LPN/RN. Fransisco Bell MD    Family History   Problem Relation Age of Onset   • No Known Problems Mother    • Gallbladder disease Father    • Heart disease Father         Cardiovascular       Social History     Tobacco Use   • Smoking status: Never Smoker   • Smokeless tobacco: Never Used   • Tobacco comment: No smoke expsosure   Vaping Use   • Vaping Use: Never used   Substance Use Topics   • Alcohol use: Yes     Comment: Occasional alcohol use   • Drug use: Never       Past Surgical History:   Procedure Laterality Date   • SKIN CANCER EXCISION  01/20/2022    Dr [facility]       Patient Active Problem List   Diagnosis   • Allergic rhinitis   • Anxiety   • GERD (gastroesophageal reflux disease)   • Hyperlipidemia   • Hypertension, benign   • Obsessive compulsive disorder   • Class 1 obesity due to excess calories without serious comorbidity with body mass index (BMI) of 31.0 to 31.9 in adult   • Thyrotoxicosis   • Acute pain of right shoulder   • Heartburn   • Annual visit for general adult medical examination with abnormal  "findings   • Encounter for CDL (commercial driving license) exam   • Atypical fibroxanthoma of skin         Current Outpatient Medications:   •  aspirin (aspirin) 81 MG EC tablet, Take  by mouth Daily., Disp: , Rfl:   •  cyclobenzaprine (FLEXERIL) 10 MG tablet, Take 1/2 to 1 tablet nightly as needed, Disp: 30 tablet, Rfl: 5  •  lisinopril (PRINIVIL,ZESTRIL) 20 MG tablet, Take 1 tablet by mouth Daily., Disp: 30 tablet, Rfl: 12  •  LORazepam (Ativan) 1 MG tablet, Take 1/2 to 1 tablet every 6 hours as needed, use sparingly, Disp: 30 tablet, Rfl: 0  •  meloxicam (MOBIC) 15 MG tablet, Take 1 tablet by mouth once daily, Disp: 30 tablet, Rfl: 12  •  omeprazole (priLOSEC) 40 MG capsule, Take 1 capsule by mouth once daily, Disp: 30 capsule, Rfl: 0  •  PARoxetine (PAXIL) 40 MG tablet, Take 1 tablet by mouth Daily., Disp: 30 tablet, Rfl: 5         Review of Systems   Constitutional: Positive for irritability. Negative for chills and diaphoresis.   Eyes: Negative for visual disturbance.   Respiratory: Negative for shortness of breath.    Cardiovascular: Negative for chest pain and palpitations.   Gastrointestinal: Negative for abdominal pain and nausea.   Endocrine: Negative for polydipsia and polyphagia.   Musculoskeletal: Negative for neck stiffness.   Skin: Negative for color change and pallor.   Neurological: Negative for seizures and syncope.   Hematological: Negative for adenopathy.   Psychiatric/Behavioral: Positive for decreased concentration and depressed mood. Negative for hallucinations and suicidal ideas. The patient is nervous/anxious and has insomnia.        I have reviewed and confirmed the accuracy of the ROS as documented by the MA/LPN/RN Fransisco Bell MD      Objective   Ht 185.4 cm (73\")   Wt 105 kg (232 lb)   BMI 30.61 kg/m²   BP Readings from Last 3 Encounters:   03/23/22 150/90   02/02/22 120/80   12/08/21 162/100     Wt Readings from Last 3 Encounters:   04/15/22 105 kg (232 lb)   03/23/22 105 kg (232 " lb)   02/02/22 106 kg (233 lb)     Physical Exam    Data / Lab Results:    No results found for: HGBA1C     No results found for: LDL, LDLDIRECT  No results found for: CHOL  No results found for: TRIG  No results found for: HDL  No results found for: PSA  No results found for: WBC, HGB, HCT, MCV, PLT  No results found for: TSH, H5JLDVL, R6IFRJA   No results found for: GLUCOSE, BUN, CREATININE, EGFRIFNONA, EGFRIFAFRI, BCR, K, CO2, CALCIUM, PROTENTOTREF, ALBUMIN, LABIL2, BILIRUBIN, AST, ALT  No results found for: ROLF, RF, SEDRATE   No results found for: CRP   No results found for: IRON, TIBC, FERRITIN   No results found for: [id number]     Garrison Trent consented to undergo a telephone visit today.  This format was necessitated by the current covid-19 virus pandemic.  21 minutes were spent phone if discussing his acute concerns and chronic medical problems.    Assessment/Plan      Medications        Problem List         LOS    Health maintenance.  Doing well.  He agrees to update his tetanus at [facility].  Discussed health maintenance, screening test, lifestyle modification.  GERD.    Much improved today on omeprazole, taking intermittently.  H. Pylori negative.  Discussed diet, lifestyle modification.  Follow-up 3 months.  Consider right upper quadrant ultrasound if persistent symptoms.  Hypertension.  Improved today back on lisinopril.  Continue baby aspirin daily.  Discussed low-sodium diet.  Follow-up recheck renal function.  Recommend ophthalmology eval.  Hyperlipidemia.  History of mild elevation.  Discussed diet, signs of lifestyle modification.   recheck fasting labs.  Rotator cuff tear.  Complete tear right supraspinatus.  Recommend second opinion from Dr. Fairchild he states he will consider next fall..  Continue PT/rehabilitation exercise discussed.  Improved with injection, repeated today.  Continue NSAIDs..  Left shoulder pain.  Likely secondary rotator cuff tendinitis.  Ice, NSAIDs, rehabilitation exercise  discussed.  Consider injection if persistent symptoms.  Anxiety.  improved today on increased dose of Paxil to 40 mg daily overall worse/flare currently secondary to acute life stressor.  Using Ativan sparingly / short term rx..  Atypical fibroxanthoma.  Clinically improved today status post resection/Mohs.  Followed by dermatology.  Sun protection discussed.  Follow-up for regular skin exams.  CDL physical.  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  Headaches.  Benign exam today, likely tension.  Start as needed nightly muscle relaxant.  Recommend ophthalmology eval.  Call return if worsening symptoms.        Problem List Items Addressed This Visit        Unprioritized    Class 1 obesity due to excess calories without serious comorbidity with body mass index (BMI) of 31.0 to 31.9 in adult    Anxiety - Primary    Overview     overall doing well on Paxil at 20 mg,   ok to attempt gradual wean this summer           Relevant Medications    LORazepam (Ativan) 1 MG tablet              Expected course, medications, and adverse effects discussed.  Call or return if worsening or persistent symptoms.  The complete contents of the Assessment and Plan and Data / Lab Results as documented above have been reviewed and addressed by myself with the patient today as part of an ongoing evaluation / treatment plan.  If some of the documentation has been copied from a previous note and is unchanged it indicates that this problem / plan has been assessed today but is stable from a previous visit and no changes have been recommended.

## 2022-04-15 NOTE — TELEPHONE ENCOUNTER
Caller: Garrison Trent    Relationship: Self    Best call back number:[phone]    What was the call regarding: PATIENT IS REQUESTING TO SPEAK TO A NURSE.     HE WAS GIVEN AN ANXIETY MED IN THE PAST THAT WAS STRONGER THAN PAXIL. HE CAN'T REMEMBER THE NAME BUT WOULD LIKE A REFILL.     Do you require a callback: YES    [facility] - [place], [place] - [address] - [phone]  - [phone]   [phone]

## 2022-08-29 DIAGNOSIS — F41.9 ANXIETY: ICD-10-CM

## 2022-08-29 RX ORDER — PAROXETINE HYDROCHLORIDE 40 MG/1
40 TABLET, FILM COATED ORAL DAILY
Qty: 90 TABLET | Refills: 0 | Status: SHIPPED | OUTPATIENT
Start: 2022-08-29 | End: 2022-12-07

## 2022-12-07 DIAGNOSIS — F41.9 ANXIETY: ICD-10-CM

## 2022-12-07 RX ORDER — PAROXETINE HYDROCHLORIDE 40 MG/1
40 TABLET, FILM COATED ORAL DAILY
Qty: 90 TABLET | Refills: 0 | Status: SHIPPED | OUTPATIENT
Start: 2022-12-07 | End: 2023-03-13

## 2023-03-13 DIAGNOSIS — F41.9 ANXIETY: ICD-10-CM

## 2023-03-13 RX ORDER — PAROXETINE HYDROCHLORIDE 40 MG/1
40 TABLET, FILM COATED ORAL DAILY
Qty: 90 TABLET | Refills: 0 | Status: SHIPPED | OUTPATIENT
Start: 2023-03-13

## 2023-07-24 PROBLEM — R12 HEARTBURN: Status: RESOLVED | Noted: 2020-11-18 | Resolved: 2023-07-24

## 2023-07-24 NOTE — PROGRESS NOTES
Subjective   Garrison Trent is a 47 y.o. male.     Chief Complaint   Patient presents with    Annual Exam    Anxiety    Hyperlipidemia    Hypertension       The patient is here: for coordination of medical care to discuss health maintenance and disease prevention to follow up on multiple medical problems.  Last comprehensive physical was on 3/23/2022.  Previous physical was performed by  Fransisco Bell MD  Overall has: vigorous activity with work/home activities, good appetite, feels well with minor complaints, good energy level, and is sleeping well. Nutrition: eating a variety of foods. Last tetanus shot was unknown. Patient's last stress test was: 7/20/2007    Anxiety  Presents for follow-up visit. Symptoms include decreased concentration, depressed mood, excessive worry, irritability and nervous/anxious behavior. Patient reports no chest pain, insomnia, nausea, palpitations, shortness of breath or suicidal ideas. The severity of symptoms is severe, causing significant distress and interfering with daily activities. The patient sleeps 8 hours per night. The quality of sleep is fair. Nighttime awakenings: none.     Compliance with medications is %.   Hypertension  This is a chronic problem. The current episode started more than 1 month ago. Associated symptoms include anxiety. Pertinent negatives include no chest pain, headaches, malaise/fatigue, palpitations or shortness of breath. Risk factors for coronary artery disease include male gender and obesity. Current antihypertension treatment includes ACE inhibitors. The current treatment provides moderate improvement.   Hyperlipidemia  This is a chronic problem. The current episode started more than 1 year ago. Exacerbating diseases include obesity. Pertinent negatives include no chest pain or shortness of breath. He is currently on no antihyperlipidemic treatment. There are no compliance problems.  Risk factors for coronary artery disease include dyslipidemia,  hypertension, male sex and obesity.      Recent Hospitalizations:  No hospitalization(s) within the last year..  ccc      I personally reviewed and updated the patient's allergies, medications, problem list, and past medical, surgical, social, and family history. I have reviewed and confirmed the accuracy of the HPI and ROS as documented by the MA/LPN/RN Fransisco Bell MD    Family History   Problem Relation Age of Onset    No Known Problems Mother     Gallbladder disease Father     Heart disease Father         Cardiovascular       Social History     Tobacco Use    Smoking status: Never    Smokeless tobacco: Never    Tobacco comments:     No smoke expsosure   Vaping Use    Vaping Use: Never used   Substance Use Topics    Alcohol use: Yes     Comment: Occasional alcohol use    Drug use: Never       Past Surgical History:   Procedure Laterality Date    SKIN CANCER EXCISION  01/20/2022    Dr [facility] with [facility]       Patient Active Problem List   Diagnosis    Allergic rhinitis    Anxiety    GERD (gastroesophageal reflux disease)    Hyperlipidemia    Hypertension, benign    Obsessive compulsive disorder    Class 1 obesity due to excess calories without serious comorbidity with body mass index (BMI) of 31.0 to 31.9 in adult    Thyrotoxicosis    Acute pain of right shoulder    Annual visit for general adult medical examination with abnormal findings    Encounter for CDL (commercial driving license) exam    Atypical fibroxanthoma of skin    AK (actinic keratosis)         Current Outpatient Medications:     aspirin (aspirin) 81 MG EC tablet, Take  by mouth Daily., Disp: , Rfl:     lisinopril (PRINIVIL,ZESTRIL) 20 MG tablet, Take 1 tablet by mouth Daily., Disp: 30 tablet, Rfl: 12    omeprazole (priLOSEC) 40 MG capsule, Take 1 capsule by mouth once daily, Disp: 30 capsule, Rfl: 0    PARoxetine (PAXIL) 40 MG tablet, Take 1 tablet by mouth Daily., Disp: 30 tablet, Rfl: 12    cyclobenzaprine (FLEXERIL) 10 MG tablet,  "Take 1/2 to 1 tablet nightly as needed (Patient not taking: Reported on 7/25/2023), Disp: 30 tablet, Rfl: 5    LORazepam (Ativan) 1 MG tablet, Take 1/2 to 1 tablet every 6 hours as needed, use sparingly (Patient not taking: Reported on 7/25/2023), Disp: 30 tablet, Rfl: 0    meloxicam (MOBIC) 15 MG tablet, Take 1 tablet by mouth once daily (Patient not taking: Reported on 7/25/2023), Disp: 30 tablet, Rfl: 12    Review of Systems   Constitutional:  Positive for irritability. Negative for chills, diaphoresis and malaise/fatigue.   Eyes:  Negative for visual disturbance.   Respiratory:  Negative for shortness of breath.    Cardiovascular:  Negative for chest pain and palpitations.   Gastrointestinal:  Negative for abdominal pain and nausea.   Endocrine: Negative for polydipsia and polyphagia.   Musculoskeletal:  Negative for neck stiffness.   Skin:  Negative for color change and pallor.   Neurological:  Negative for seizures and syncope.   Hematological:  Negative for adenopathy.   Psychiatric/Behavioral:  Positive for decreased concentration and depressed mood. Negative for hallucinations and suicidal ideas. The patient is nervous/anxious. The patient does not have insomnia.      I have reviewed and confirmed the accuracy of the ROS as documented by the MA/LPN/RN Fransisco Bell MD      Objective   /96 (BP Location: Right arm, Patient Position: Sitting, Cuff Size: Large Adult)   Pulse 78   Temp 98.6 °F (37 °C)   Resp 16   Ht 185.4 cm (73\")   Wt 108 kg (237 lb)   SpO2 99%   BMI 31.27 kg/m²   BP Readings from Last 3 Encounters:   07/25/23 142/96   03/23/22 150/90   02/02/22 120/80     Wt Readings from Last 3 Encounters:   07/25/23 108 kg (237 lb)   04/15/22 105 kg (232 lb)   03/23/22 105 kg (232 lb)     Physical Exam  Constitutional:       Appearance: He is well-developed. He is not diaphoretic.   HENT:      Head: Normocephalic.      Right Ear: Tympanic membrane, ear canal and external ear normal.      Left " Ear: Tympanic membrane, ear canal and external ear normal.      Nose: Nose normal.   Eyes:      General: Lids are normal.      Conjunctiva/sclera: Conjunctivae normal.      Pupils: Pupils are equal, round, and reactive to light.   Neck:      Thyroid: No thyromegaly.      Vascular: No carotid bruit or JVD.      Trachea: No tracheal deviation.   Cardiovascular:      Rate and Rhythm: Normal rate and regular rhythm.      Heart sounds: Normal heart sounds. No murmur heard.    No friction rub. No gallop.   Pulmonary:      Effort: Pulmonary effort is normal.      Breath sounds: Normal breath sounds. No stridor. No decreased breath sounds, wheezing or rales.   Abdominal:      General: Bowel sounds are normal. There is no distension.      Palpations: Abdomen is soft. There is no mass.      Tenderness: There is no abdominal tenderness. There is no guarding or rebound.      Hernia: No hernia is present.   Lymphadenopathy:      Head:      Right side of head: No submental, submandibular, tonsillar, preauricular, posterior auricular or occipital adenopathy.      Left side of head: No submental, submandibular, tonsillar, preauricular, posterior auricular or occipital adenopathy.      Cervical: No cervical adenopathy.   Skin:     General: Skin is warm and dry.      Coloration: Skin is not pale.      Comments: Small AK right face, improved   Neurological:      Mental Status: He is alert and oriented to person, place, and time.      Cranial Nerves: No cranial nerve deficit.      Sensory: No sensory deficit.      Motor: No tremor, abnormal muscle tone or seizure activity.      Coordination: Coordination normal.      Gait: Gait normal.      Deep Tendon Reflexes: Reflexes are normal and symmetric.       Data / Lab Results:    No results found for: HGBA1C     No results found for: LDL, LDLDIRECT  No results found for: CHOL  No results found for: TRIG  No results found for: HDL  No results found for: PSA  No results found for: WBC, HGB,  HCT, MCV, PLT  No results found for: TSH, D9OPODG, R6EXLDM, THYROIDAB  No results found for: GLUCOSE, BUN, CREATININE, EGFRIFNONA, EGFRIFAFRI, BCR, K, CO2, CALCIUM, PROTENTOTREF, ALBUMIN, LABIL2, BILIRUBIN, AST, ALT  No results found for: ROLF, RF, SEDRATE   No results found for: CRP   No results found for: IRON, TIBC, FERRITIN   No results found for: [id number]     Age-appropriate Screening Schedule:  Refer to the list below for future screening recommendations based on patient's age, sex and/or medical conditions. Orders for these recommended tests are listed in the plan section. The patient has been provided with a written plan.    Health Maintenance   Topic Date Due    COLORECTAL CANCER SCREENING  Never done    COVID-19 Vaccine (1) Never done    TDAP/TD VACCINES (1 - Tdap) Never done    HEPATITIS C SCREENING  Never done    LIPID PANEL  Never done    ANNUAL PHYSICAL  03/23/2023    INFLUENZA VACCINE  10/01/2023    Pneumococcal Vaccine 0-64  Aged Out       Procedure note: Actinic keratosis face frozen repeated with liquid nitrogen today in fashion, he tolerated procedure well, no complications.    Assessment & Plan      Medications        Problem List         LOS    Physical.  Doing well.  He agrees to update his tetanus at [facility].  Discussed health maintenance, screening test, lifestyle modification.  GERD.    Much improved today on omeprazole, taking intermittently.  H. Pylori negative.  Discussed diet, lifestyle modification.  Consider right upper quadrant ultrasound if persistent symptoms.  Hypertension.  Has done well on lisinopril, restart.  Continue baby aspirin daily.  Discussed low-sodium diet.  Follow-up recheck renal function.  Recommend ophthalmology eval.  Hyperlipidia.   History of mild elevation.  Discussed diet, signs of lifestyle modification.   recheck fasting labs.  Rotator cuff tear.  Complete tear right supraspinatus.  Recommend second opinion from Dr. Fairchild he states he will consider next  fall..  Continue PT/rehabilitation exercise discussed.  Improved with injection, repeated today.  Continue NSAIDs..  Left shoulder pain.  Likely secondary rotator cuff tendinitis.  Ice, NSAIDs, rehabilitation exercise discussed.  Consider injection if persistent symptoms.  Anxiety.  improved today on increased dose of Paxil to 40 mg daily overall worse/flare currently secondary to acute life stressor.  Using Ativan sparingly / short term rx..  Atypical fibroxanthoma.  Clinically improved today status post resection/Mohs.  Followed by dermatology.  Sun protection discussed.  Follow-up for regular skin exams.  CDL physical.  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  Headaches.  Benign exam today, likely tension.  Start as needed nightly muscle relaxant.  Recommend ophthalmology eval.  Call return if worsening symptoms.  Family history cerebral aneurysm.  His father, not ruptured, being monitored.  Consider cerebral aneurysm screening angiogram at age 50.  AK.  Right face, frozen today, has dermatology follow upu pcoming.  Consider repeat freezing, resection if persistent symptoms     Diagnoses and all orders for this visit:    1. Annual visit for general adult medical examination with abnormal findings (Primary)    2. Mixed hyperlipidemia  -     Lipid Panel With / Chol / HDL Ratio    3. Hypertension, benign  -     CBC & Differential  -     Comprehensive Metabolic Panel  -     TSH  -     lisinopril (PRINIVIL,ZESTRIL) 20 MG tablet; Take 1 tablet by mouth Daily.  Dispense: 30 tablet; Refill: 12    4. Gastroesophageal reflux disease, unspecified whether esophagitis present    5. Class 1 obesity due to excess calories without serious comorbidity with body mass index (BMI) of 31.0 to 31.9 in adult  Assessment & Plan:  Patient's (Body mass index is 31.27 kg/m².) indicates that they are obese (BMI >30) with health conditions that include hypertension and dyslipidemias . Weight is unchanged. BMI   is above average; BMI management plan is completed. We discussed portion control and increasing exercise.       6. Anxiety  -     Discontinue: PARoxetine (PAXIL) 40 MG tablet; Take 1 tablet by mouth Daily.  Dispense: 30 tablet; Refill: 0  -     PARoxetine (PAXIL) 40 MG tablet; Take 1 tablet by mouth Daily.  Dispense: 30 tablet; Refill: 12    7. AK (actinic keratosis)              Expected course, medications, and adverse effects discussed.  Call or return if worsening or persistent symptoms.  I wore protective equipment throughout this patient encounter including a mask, gloves, and eye protection.  Hand hygiene was performed before donning protective equipment and after removal when leaving the room. The complete contents of the Assessment and Plan and Data / Lab Results as documented above have been reviewed and addressed by myself with the patient today as part of an ongoing evaluation / treatment plan.  If some of the documentation has been copied from a previous note and is unchanged it indicates that this problem / plan has been assessed today but is stable from a previous visit and no changes have been recommended.  The separate E/M service provided today is significant, medically necessary, and separately identifiable.

## 2023-07-25 ENCOUNTER — OFFICE VISIT (OUTPATIENT)
Dept: FAMILY MEDICINE CLINIC | Facility: CLINIC | Age: 48
End: 2023-07-25
Payer: COMMERCIAL

## 2023-07-25 VITALS
SYSTOLIC BLOOD PRESSURE: 142 MMHG | OXYGEN SATURATION: 99 % | BODY MASS INDEX: 31.41 KG/M2 | RESPIRATION RATE: 16 BRPM | HEIGHT: 73 IN | DIASTOLIC BLOOD PRESSURE: 96 MMHG | TEMPERATURE: 98.6 F | HEART RATE: 78 BPM | WEIGHT: 237 LBS

## 2023-07-25 DIAGNOSIS — Z00.01 ANNUAL VISIT FOR GENERAL ADULT MEDICAL EXAMINATION WITH ABNORMAL FINDINGS: Primary | ICD-10-CM

## 2023-07-25 DIAGNOSIS — I10 HYPERTENSION, BENIGN: ICD-10-CM

## 2023-07-25 DIAGNOSIS — F41.9 ANXIETY: ICD-10-CM

## 2023-07-25 DIAGNOSIS — K21.9 GASTROESOPHAGEAL REFLUX DISEASE, UNSPECIFIED WHETHER ESOPHAGITIS PRESENT: ICD-10-CM

## 2023-07-25 DIAGNOSIS — L57.0 AK (ACTINIC KERATOSIS): ICD-10-CM

## 2023-07-25 DIAGNOSIS — E78.2 MIXED HYPERLIPIDEMIA: ICD-10-CM

## 2023-07-25 DIAGNOSIS — E66.09 CLASS 1 OBESITY DUE TO EXCESS CALORIES WITHOUT SERIOUS COMORBIDITY WITH BODY MASS INDEX (BMI) OF 31.0 TO 31.9 IN ADULT: ICD-10-CM

## 2023-07-25 PROCEDURE — 99396 PREV VISIT EST AGE 40-64: CPT | Performed by: FAMILY MEDICINE

## 2023-07-25 PROCEDURE — 17000 DESTRUCT PREMALG LESION: CPT | Performed by: FAMILY MEDICINE

## 2023-07-25 PROCEDURE — 99213 OFFICE O/P EST LOW 20 MIN: CPT | Performed by: FAMILY MEDICINE

## 2023-07-25 RX ORDER — PAROXETINE HYDROCHLORIDE 40 MG/1
40 TABLET, FILM COATED ORAL DAILY
Qty: 30 TABLET | Refills: 12 | Status: SHIPPED | OUTPATIENT
Start: 2023-07-25

## 2023-07-25 RX ORDER — PAROXETINE HYDROCHLORIDE 40 MG/1
40 TABLET, FILM COATED ORAL DAILY
Qty: 30 TABLET | Refills: 0 | Status: SHIPPED | OUTPATIENT
Start: 2023-07-25 | End: 2023-07-25 | Stop reason: SDUPTHER

## 2023-07-25 RX ORDER — LISINOPRIL 20 MG/1
20 TABLET ORAL DAILY
Qty: 30 TABLET | Refills: 12 | Status: SHIPPED | OUTPATIENT
Start: 2023-07-25

## 2023-07-25 NOTE — ASSESSMENT & PLAN NOTE
Patient's (Body mass index is 31.27 kg/m².) indicates that they are obese (BMI >30) with health conditions that include hypertension and dyslipidemias . Weight is unchanged. BMI  is above average; BMI management plan is completed. We discussed portion control and increasing exercise.

## 2023-07-26 LAB
ALBUMIN SERPL-MCNC: 4.4 G/DL (ref 4.1–5.1)
ALBUMIN/GLOB SERPL: 1.6 {RATIO} (ref 1.2–2.2)
ALP SERPL-CCNC: 76 IU/L (ref 44–121)
ALT SERPL-CCNC: 30 IU/L (ref 0–44)
AST SERPL-CCNC: 26 IU/L (ref 0–40)
BASOPHILS # BLD AUTO: 0 X10E3/UL (ref 0–0.2)
BASOPHILS NFR BLD AUTO: 1 %
BILIRUB SERPL-MCNC: 0.5 MG/DL (ref 0–1.2)
BUN SERPL-MCNC: 18 MG/DL (ref 6–24)
BUN/CREAT SERPL: 17 (ref 9–20)
CALCIUM SERPL-MCNC: 9.5 MG/DL (ref 8.7–10.2)
CHLORIDE SERPL-SCNC: 100 MMOL/L (ref 96–106)
CHOLEST SERPL-MCNC: 302 MG/DL (ref 100–199)
CHOLEST/HDLC SERPL: 6.7 RATIO (ref 0–5)
CO2 SERPL-SCNC: 24 MMOL/L (ref 20–29)
CREAT SERPL-MCNC: 1.03 MG/DL (ref 0.76–1.27)
EGFRCR SERPLBLD CKD-EPI 2021: 91 ML/MIN/1.73
EOSINOPHIL # BLD AUTO: 0 X10E3/UL (ref 0–0.4)
EOSINOPHIL NFR BLD AUTO: 1 %
ERYTHROCYTE [DISTWIDTH] IN BLOOD BY AUTOMATED COUNT: 13.5 % (ref 11.6–15.4)
GLOBULIN SER CALC-MCNC: 2.8 G/DL (ref 1.5–4.5)
GLUCOSE SERPL-MCNC: 104 MG/DL (ref 70–99)
HCT VFR BLD AUTO: 45.2 % (ref 37.5–51)
HDLC SERPL-MCNC: 45 MG/DL
HGB BLD-MCNC: 15.1 G/DL (ref 13–17.7)
IMM GRANULOCYTES # BLD AUTO: 0 X10E3/UL (ref 0–0.1)
IMM GRANULOCYTES NFR BLD AUTO: 0 %
LABORATORY COMMENT REPORT: ABNORMAL
LDLC SERPL CALC-MCNC: 199 MG/DL (ref 0–99)
LYMPHOCYTES # BLD AUTO: 1.2 X10E3/UL (ref 0.7–3.1)
LYMPHOCYTES NFR BLD AUTO: 27 %
MCH RBC QN AUTO: 30.1 PG (ref 26.6–33)
MCHC RBC AUTO-ENTMCNC: 33.4 G/DL (ref 31.5–35.7)
MCV RBC AUTO: 90 FL (ref 79–97)
MONOCYTES # BLD AUTO: 0.5 X10E3/UL (ref 0.1–0.9)
MONOCYTES NFR BLD AUTO: 11 %
NEUTROPHILS # BLD AUTO: 2.6 X10E3/UL (ref 1.4–7)
NEUTROPHILS NFR BLD AUTO: 60 %
PLATELET # BLD AUTO: 245 X10E3/UL (ref 150–450)
POTASSIUM SERPL-SCNC: 4.8 MMOL/L (ref 3.5–5.2)
PROT SERPL-MCNC: 7.2 G/DL (ref 6–8.5)
RBC # BLD AUTO: 5.02 X10E6/UL (ref 4.14–5.8)
SODIUM SERPL-SCNC: 139 MMOL/L (ref 134–144)
TRIGL SERPL-MCNC: 292 MG/DL (ref 0–149)
TSH SERPL DL<=0.005 MIU/L-ACNC: 0.39 UIU/ML (ref 0.45–4.5)
VLDLC SERPL CALC-MCNC: 58 MG/DL (ref 5–40)
WBC # BLD AUTO: 4.4 X10E3/UL (ref 3.4–10.8)

## 2023-09-18 ENCOUNTER — TELEPHONE (OUTPATIENT)
Dept: FAMILY MEDICINE CLINIC | Facility: CLINIC | Age: 48
End: 2023-09-18
Payer: COMMERCIAL

## 2023-09-18 DIAGNOSIS — I10 HYPERTENSION, BENIGN: Primary | ICD-10-CM

## 2023-09-18 DIAGNOSIS — E78.2 MIXED HYPERLIPIDEMIA: Primary | ICD-10-CM

## 2023-09-18 RX ORDER — ROSUVASTATIN CALCIUM 5 MG/1
5 TABLET, COATED ORAL DAILY
Qty: 90 TABLET | Refills: 3 | Status: SHIPPED | OUTPATIENT
Start: 2023-09-18

## 2023-09-18 RX ORDER — CARVEDILOL 6.25 MG/1
6.25 TABLET ORAL 2 TIMES DAILY WITH MEALS
Qty: 60 TABLET | Refills: 5 | Status: SHIPPED | OUTPATIENT
Start: 2023-09-18

## 2023-09-18 NOTE — TELEPHONE ENCOUNTER
That is okay for him to discontinue lisinopril, send in carvedilol 6.25 mg twice daily #60 with 5 refills, scheduling back in in 2 months or so to recheck his blood pressure, thanks

## 2023-09-18 NOTE — TELEPHONE ENCOUNTER
I spoke with Garrison and he states that he took lisinopril for approx 3 days and some severe muscle aches while working and exertion and he discontinued it. He states that he had improvement the next day. Asking if you think this was caused by the lisinopril and if he should switch. Last took med 8/1/23.

## 2024-01-07 DIAGNOSIS — M25.511 ACUTE PAIN OF RIGHT SHOULDER: Primary | ICD-10-CM

## 2024-01-12 RX ORDER — MELOXICAM 15 MG/1
15 TABLET ORAL DAILY
Qty: 90 TABLET | Refills: 1 | Status: SHIPPED | OUTPATIENT
Start: 2024-01-12

## 2024-01-12 RX ORDER — MELOXICAM 15 MG/1
TABLET ORAL
Qty: 90 TABLET | Refills: 3 | Status: SHIPPED | OUTPATIENT
Start: 2024-01-12 | End: 2024-01-12 | Stop reason: SDUPTHER